# Patient Record
Sex: FEMALE | Race: WHITE | Employment: UNEMPLOYED | URBAN - METROPOLITAN AREA
[De-identification: names, ages, dates, MRNs, and addresses within clinical notes are randomized per-mention and may not be internally consistent; named-entity substitution may affect disease eponyms.]

---

## 2020-07-08 ENCOUNTER — HOSPITAL ENCOUNTER (OUTPATIENT)
Dept: PREADMISSION TESTING | Age: 70
Discharge: HOME | End: 2020-07-08
Payer: MEDICARE

## 2020-07-08 DIAGNOSIS — Z01.818 PREOPERATIVE EXAMINATION, UNSPECIFIED: ICD-10-CM

## 2020-07-08 LAB
ALBUMIN SERPL-MCNC: 3.9 G/DL (ref 3.4–5)
ALBUMIN/GLOB SERPL: 1.1 {RATIO} (ref 0.8–1.7)
ALP SERPL-CCNC: 96 U/L (ref 45–117)
ALT SERPL-CCNC: 29 U/L (ref 13–56)
ANION GAP SERPL CALC-SCNC: 6 MMOL/L (ref 3–18)
APPEARANCE UR: ABNORMAL
APTT PPP: 38.6 SEC (ref 23–36.4)
AST SERPL-CCNC: 18 U/L (ref 10–38)
ATRIAL RATE: 375 BPM
BACTERIA URNS QL MICRO: ABNORMAL /HPF
BASOPHILS # BLD: 0 K/UL (ref 0–0.1)
BASOPHILS NFR BLD: 0 % (ref 0–2)
BILIRUB SERPL-MCNC: 0.7 MG/DL (ref 0.2–1)
BILIRUB UR QL: NEGATIVE
BUN SERPL-MCNC: 24 MG/DL (ref 7–18)
BUN/CREAT SERPL: 26 (ref 12–20)
CALCIUM SERPL-MCNC: 8.7 MG/DL (ref 8.5–10.1)
CALCULATED R AXIS, ECG10: 64 DEGREES
CALCULATED T AXIS, ECG11: -145 DEGREES
CHLORIDE SERPL-SCNC: 103 MMOL/L (ref 100–111)
CO2 SERPL-SCNC: 29 MMOL/L (ref 21–32)
COLOR UR: YELLOW
CREAT SERPL-MCNC: 0.93 MG/DL (ref 0.6–1.3)
DIAGNOSIS, 93000: NORMAL
DIFFERENTIAL METHOD BLD: ABNORMAL
EOSINOPHIL # BLD: 0.2 K/UL (ref 0–0.4)
EOSINOPHIL NFR BLD: 2 % (ref 0–5)
EPITH CASTS URNS QL MICRO: ABNORMAL /LPF (ref 0–5)
ERYTHROCYTE [DISTWIDTH] IN BLOOD BY AUTOMATED COUNT: 13.4 % (ref 11.6–14.5)
ERYTHROCYTE [SEDIMENTATION RATE] IN BLOOD: 3 MM/HR (ref 0–30)
EST. AVERAGE GLUCOSE BLD GHB EST-MCNC: 169 MG/DL
GLOBULIN SER CALC-MCNC: 3.6 G/DL (ref 2–4)
GLUCOSE SERPL-MCNC: 191 MG/DL (ref 74–99)
GLUCOSE UR STRIP.AUTO-MCNC: NEGATIVE MG/DL
HBA1C MFR BLD: 7.5 % (ref 4.2–5.6)
HCT VFR BLD AUTO: 46.5 % (ref 35–45)
HGB BLD-MCNC: 15.1 G/DL (ref 12–16)
HGB UR QL STRIP: ABNORMAL
INR PPP: 2.4 (ref 0.8–1.2)
KETONES UR QL STRIP.AUTO: NEGATIVE MG/DL
LEUKOCYTE ESTERASE UR QL STRIP.AUTO: ABNORMAL
LYMPHOCYTES # BLD: 1.8 K/UL (ref 0.9–3.6)
LYMPHOCYTES NFR BLD: 17 % (ref 21–52)
MCH RBC QN AUTO: 29.8 PG (ref 24–34)
MCHC RBC AUTO-ENTMCNC: 32.5 G/DL (ref 31–37)
MCV RBC AUTO: 91.7 FL (ref 74–97)
MONOCYTES # BLD: 0.7 K/UL (ref 0.05–1.2)
MONOCYTES NFR BLD: 6 % (ref 3–10)
NEUTS SEG # BLD: 7.7 K/UL (ref 1.8–8)
NEUTS SEG NFR BLD: 75 % (ref 40–73)
NITRITE UR QL STRIP.AUTO: POSITIVE
PH UR STRIP: 5 [PH] (ref 5–8)
PLATELET # BLD AUTO: 199 K/UL (ref 135–420)
PMV BLD AUTO: 10.5 FL (ref 9.2–11.8)
POTASSIUM SERPL-SCNC: 4.1 MMOL/L (ref 3.5–5.5)
PROT SERPL-MCNC: 7.5 G/DL (ref 6.4–8.2)
PROT UR STRIP-MCNC: NEGATIVE MG/DL
PROTHROMBIN TIME: 25.5 SEC (ref 11.5–15.2)
Q-T INTERVAL, ECG07: 410 MS
QRS DURATION, ECG06: 90 MS
QTC CALCULATION (BEZET), ECG08: 512 MS
RBC # BLD AUTO: 5.07 M/UL (ref 4.2–5.3)
RBC #/AREA URNS HPF: ABNORMAL /HPF (ref 0–5)
SODIUM SERPL-SCNC: 138 MMOL/L (ref 136–145)
SP GR UR REFRACTOMETRY: 1.02 (ref 1–1.03)
UROBILINOGEN UR QL STRIP.AUTO: 0.2 EU/DL (ref 0.2–1)
VENTRICULAR RATE, ECG03: 94 BPM
WBC # BLD AUTO: 10.4 K/UL (ref 4.6–13.2)
WBC URNS QL MICRO: ABNORMAL /HPF (ref 0–5)

## 2020-07-08 PROCEDURE — 87186 SC STD MICRODIL/AGAR DIL: CPT

## 2020-07-08 PROCEDURE — 85730 THROMBOPLASTIN TIME PARTIAL: CPT

## 2020-07-08 PROCEDURE — 87086 URINE CULTURE/COLONY COUNT: CPT

## 2020-07-08 PROCEDURE — 83036 HEMOGLOBIN GLYCOSYLATED A1C: CPT

## 2020-07-08 PROCEDURE — 81001 URINALYSIS AUTO W/SCOPE: CPT

## 2020-07-08 PROCEDURE — 36415 COLL VENOUS BLD VENIPUNCTURE: CPT

## 2020-07-08 PROCEDURE — 85652 RBC SED RATE AUTOMATED: CPT

## 2020-07-08 PROCEDURE — 85610 PROTHROMBIN TIME: CPT

## 2020-07-08 PROCEDURE — 87077 CULTURE AEROBIC IDENTIFY: CPT

## 2020-07-08 PROCEDURE — 93005 ELECTROCARDIOGRAM TRACING: CPT

## 2020-07-08 PROCEDURE — 85025 COMPLETE CBC W/AUTO DIFF WBC: CPT

## 2020-07-08 PROCEDURE — 80053 COMPREHEN METABOLIC PANEL: CPT

## 2020-07-09 LAB
BACTERIA SPEC CULT: NORMAL
BACTERIA SPEC CULT: NORMAL
SERVICE CMNT-IMP: NORMAL

## 2020-07-11 LAB
BACTERIA SPEC CULT: ABNORMAL
CC UR VC: ABNORMAL
SERVICE CMNT-IMP: ABNORMAL

## 2021-01-07 ENCOUNTER — TRANSCRIBE ORDER (OUTPATIENT)
Dept: REGISTRATION | Age: 71
End: 2021-01-07

## 2021-01-07 ENCOUNTER — HOSPITAL ENCOUNTER (OUTPATIENT)
Dept: PREADMISSION TESTING | Age: 71
Discharge: HOME | End: 2021-01-07
Payer: MEDICARE

## 2021-01-07 DIAGNOSIS — Z01.818 OTHER SPECIFIED PRE-OPERATIVE EXAMINATION: Primary | ICD-10-CM

## 2021-01-07 LAB
ALBUMIN SERPL-MCNC: 3.6 G/DL (ref 3.4–5)
ALBUMIN/GLOB SERPL: 1.1 {RATIO} (ref 0.8–1.7)
ALP SERPL-CCNC: 75 U/L (ref 45–117)
ALT SERPL-CCNC: 25 U/L (ref 13–56)
ANION GAP SERPL CALC-SCNC: 7 MMOL/L (ref 3–18)
APPEARANCE UR: CLEAR
APTT PPP: 38.3 SEC (ref 23–36.4)
AST SERPL-CCNC: 17 U/L (ref 10–38)
ATRIAL RATE: 131 BPM
BACTERIA URNS QL MICRO: NEGATIVE /HPF
BASOPHILS # BLD: 0 K/UL (ref 0–0.1)
BASOPHILS NFR BLD: 0 % (ref 0–2)
BILIRUB SERPL-MCNC: 0.8 MG/DL (ref 0.2–1)
BILIRUB UR QL: NEGATIVE
BUN SERPL-MCNC: 22 MG/DL (ref 7–18)
BUN/CREAT SERPL: 32 (ref 12–20)
CALCIUM SERPL-MCNC: 8.5 MG/DL (ref 8.5–10.1)
CALCULATED R AXIS, ECG10: 71 DEGREES
CALCULATED T AXIS, ECG11: -104 DEGREES
CHLORIDE SERPL-SCNC: 105 MMOL/L (ref 100–111)
CO2 SERPL-SCNC: 30 MMOL/L (ref 21–32)
COLOR UR: YELLOW
CREAT SERPL-MCNC: 0.68 MG/DL (ref 0.6–1.3)
DIAGNOSIS, 93000: NORMAL
DIFFERENTIAL METHOD BLD: ABNORMAL
EOSINOPHIL # BLD: 0.2 K/UL (ref 0–0.4)
EOSINOPHIL NFR BLD: 2 % (ref 0–5)
EPITH CASTS URNS QL MICRO: NORMAL /LPF (ref 0–5)
ERYTHROCYTE [DISTWIDTH] IN BLOOD BY AUTOMATED COUNT: 13.8 % (ref 11.6–14.5)
ERYTHROCYTE [SEDIMENTATION RATE] IN BLOOD: 9 MM/HR (ref 0–30)
EST. AVERAGE GLUCOSE BLD GHB EST-MCNC: 137 MG/DL
GLOBULIN SER CALC-MCNC: 3.2 G/DL (ref 2–4)
GLUCOSE SERPL-MCNC: 102 MG/DL (ref 74–99)
GLUCOSE UR STRIP.AUTO-MCNC: NEGATIVE MG/DL
HBA1C MFR BLD: 6.4 % (ref 4.2–5.6)
HCT VFR BLD AUTO: 45.1 % (ref 35–45)
HGB BLD-MCNC: 14.7 G/DL (ref 12–16)
HGB UR QL STRIP: ABNORMAL
INR PPP: 2.1 (ref 0.8–1.2)
KETONES UR QL STRIP.AUTO: NEGATIVE MG/DL
LEUKOCYTE ESTERASE UR QL STRIP.AUTO: ABNORMAL
LYMPHOCYTES # BLD: 1.9 K/UL (ref 0.9–3.6)
LYMPHOCYTES NFR BLD: 23 % (ref 21–52)
MCH RBC QN AUTO: 30.4 PG (ref 24–34)
MCHC RBC AUTO-ENTMCNC: 32.6 G/DL (ref 31–37)
MCV RBC AUTO: 93.4 FL (ref 74–97)
MONOCYTES # BLD: 0.5 K/UL (ref 0.05–1.2)
MONOCYTES NFR BLD: 6 % (ref 3–10)
NEUTS SEG # BLD: 5.6 K/UL (ref 1.8–8)
NEUTS SEG NFR BLD: 69 % (ref 40–73)
NITRITE UR QL STRIP.AUTO: NEGATIVE
PH UR STRIP: 7 [PH] (ref 5–8)
PLATELET # BLD AUTO: 186 K/UL (ref 135–420)
PMV BLD AUTO: 10.7 FL (ref 9.2–11.8)
POTASSIUM SERPL-SCNC: 4.4 MMOL/L (ref 3.5–5.5)
PROT SERPL-MCNC: 6.8 G/DL (ref 6.4–8.2)
PROT UR STRIP-MCNC: NEGATIVE MG/DL
PROTHROMBIN TIME: 22.8 SEC (ref 11.5–15.2)
Q-T INTERVAL, ECG07: 204 MS
QRS DURATION, ECG06: 90 MS
QTC CALCULATION (BEZET), ECG08: 239 MS
RBC # BLD AUTO: 4.83 M/UL (ref 4.2–5.3)
RBC #/AREA URNS HPF: NORMAL /HPF (ref 0–5)
SODIUM SERPL-SCNC: 142 MMOL/L (ref 136–145)
SP GR UR REFRACTOMETRY: 1.02 (ref 1–1.03)
UROBILINOGEN UR QL STRIP.AUTO: 1 EU/DL (ref 0.2–1)
VENTRICULAR RATE, ECG03: 83 BPM
WBC # BLD AUTO: 8.2 K/UL (ref 4.6–13.2)
WBC URNS QL MICRO: NORMAL /HPF (ref 0–5)

## 2021-01-07 PROCEDURE — 93005 ELECTROCARDIOGRAM TRACING: CPT

## 2021-01-07 PROCEDURE — 36415 COLL VENOUS BLD VENIPUNCTURE: CPT

## 2021-01-07 PROCEDURE — 80053 COMPREHEN METABOLIC PANEL: CPT

## 2021-01-07 PROCEDURE — 85652 RBC SED RATE AUTOMATED: CPT

## 2021-01-07 PROCEDURE — 85730 THROMBOPLASTIN TIME PARTIAL: CPT

## 2021-01-07 PROCEDURE — 85025 COMPLETE CBC W/AUTO DIFF WBC: CPT

## 2021-01-07 PROCEDURE — 85610 PROTHROMBIN TIME: CPT

## 2021-01-07 PROCEDURE — 81001 URINALYSIS AUTO W/SCOPE: CPT

## 2021-01-07 PROCEDURE — 83036 HEMOGLOBIN GLYCOSYLATED A1C: CPT

## 2021-01-09 LAB
BACTERIA SPEC CULT: NORMAL
BACTERIA SPEC CULT: NORMAL
SERVICE CMNT-IMP: NORMAL

## 2021-01-19 ENCOUNTER — HOSPITAL ENCOUNTER (OUTPATIENT)
Dept: PREADMISSION TESTING | Age: 71
Discharge: HOME | End: 2021-01-19
Payer: MEDICARE

## 2021-01-19 PROCEDURE — U0003 INFECTIOUS AGENT DETECTION BY NUCLEIC ACID (DNA OR RNA); SEVERE ACUTE RESPIRATORY SYNDROME CORONAVIRUS 2 (SARS-COV-2) (CORONAVIRUS DISEASE [COVID-19]), AMPLIFIED PROBE TECHNIQUE, MAKING USE OF HIGH THROUGHPUT TECHNOLOGIES AS DESCRIBED BY CMS-2020-01-R: HCPCS

## 2021-01-20 LAB — SARS-COV-2, COV2NT: NOT DETECTED

## 2021-01-24 ENCOUNTER — ANESTHESIA EVENT (OUTPATIENT)
Dept: SURGERY | Age: 71
DRG: 982 | End: 2021-01-24
Payer: MEDICARE

## 2021-01-25 ENCOUNTER — APPOINTMENT (OUTPATIENT)
Dept: GENERAL RADIOLOGY | Age: 71
DRG: 982 | End: 2021-01-25
Attending: PHYSICIAN ASSISTANT
Payer: MEDICARE

## 2021-01-25 ENCOUNTER — ANESTHESIA (OUTPATIENT)
Dept: SURGERY | Age: 71
DRG: 982 | End: 2021-01-25
Payer: MEDICARE

## 2021-01-25 ENCOUNTER — HOSPITAL ENCOUNTER (INPATIENT)
Age: 71
LOS: 5 days | Discharge: HOME HEALTH | DRG: 982 | End: 2021-02-01
Attending: ORTHOPAEDIC SURGERY | Admitting: ORTHOPAEDIC SURGERY
Payer: MEDICARE

## 2021-01-25 DIAGNOSIS — Z96.651 S/P TKR (TOTAL KNEE REPLACEMENT), RIGHT: Primary | ICD-10-CM

## 2021-01-25 PROBLEM — M17.11 OSTEOARTHRITIS OF RIGHT KNEE: Status: ACTIVE | Noted: 2021-01-25

## 2021-01-25 LAB
APTT PPP: 27.8 SEC (ref 23–36.4)
GLUCOSE BLD STRIP.AUTO-MCNC: 148 MG/DL (ref 70–110)
GLUCOSE BLD STRIP.AUTO-MCNC: 93 MG/DL (ref 70–110)
HCT VFR BLD AUTO: 40.2 % (ref 35–45)
HGB BLD-MCNC: 12.8 G/DL (ref 12–16)
INR PPP: 1.1 (ref 0.8–1.2)
PROTHROMBIN TIME: 14 SEC (ref 11.5–15.2)

## 2021-01-25 PROCEDURE — 76060000036 HC ANESTHESIA 2.5 TO 3 HR: Performed by: ORTHOPAEDIC SURGERY

## 2021-01-25 PROCEDURE — 0SRC0JA REPLACEMENT OF RIGHT KNEE JOINT WITH SYNTHETIC SUBSTITUTE, UNCEMENTED, OPEN APPROACH: ICD-10-PCS | Performed by: ORTHOPAEDIC SURGERY

## 2021-01-25 PROCEDURE — 82962 GLUCOSE BLOOD TEST: CPT

## 2021-01-25 PROCEDURE — C9290 INJ, BUPIVACAINE LIPOSOME: HCPCS | Performed by: ORTHOPAEDIC SURGERY

## 2021-01-25 PROCEDURE — 86923 COMPATIBILITY TEST ELECTRIC: CPT

## 2021-01-25 PROCEDURE — 77030002933 HC SUT MCRYL J&J -A: Performed by: ORTHOPAEDIC SURGERY

## 2021-01-25 PROCEDURE — 74011250636 HC RX REV CODE- 250/636: Performed by: ORTHOPAEDIC SURGERY

## 2021-01-25 PROCEDURE — 77030000032 HC CUF TRNQT ZIMM -B: Performed by: ORTHOPAEDIC SURGERY

## 2021-01-25 PROCEDURE — 2709999900 HC NON-CHARGEABLE SUPPLY: Performed by: ORTHOPAEDIC SURGERY

## 2021-01-25 PROCEDURE — 77030016060 HC NDL NRV BLK TELE -A: Performed by: ANESTHESIOLOGY

## 2021-01-25 PROCEDURE — 77030020782 HC GWN BAIR PAWS FLX 3M -B: Performed by: ORTHOPAEDIC SURGERY

## 2021-01-25 PROCEDURE — 77030027138 HC INCENT SPIROMETER -A: Performed by: ORTHOPAEDIC SURGERY

## 2021-01-25 PROCEDURE — C1776 JOINT DEVICE (IMPLANTABLE): HCPCS | Performed by: ORTHOPAEDIC SURGERY

## 2021-01-25 PROCEDURE — 77030038011: Performed by: ORTHOPAEDIC SURGERY

## 2021-01-25 PROCEDURE — 77030031139 HC SUT VCRL2 J&J -A: Performed by: ORTHOPAEDIC SURGERY

## 2021-01-25 PROCEDURE — 76010000132 HC OR TIME 2.5 TO 3 HR: Performed by: ORTHOPAEDIC SURGERY

## 2021-01-25 PROCEDURE — 73560 X-RAY EXAM OF KNEE 1 OR 2: CPT

## 2021-01-25 PROCEDURE — 85730 THROMBOPLASTIN TIME PARTIAL: CPT

## 2021-01-25 PROCEDURE — 74011000250 HC RX REV CODE- 250: Performed by: ANESTHESIOLOGY

## 2021-01-25 PROCEDURE — 86901 BLOOD TYPING SEROLOGIC RH(D): CPT

## 2021-01-25 PROCEDURE — 74011000258 HC RX REV CODE- 258: Performed by: ORTHOPAEDIC SURGERY

## 2021-01-25 PROCEDURE — 77030020269 HC MISC IMPL: Performed by: ORTHOPAEDIC SURGERY

## 2021-01-25 PROCEDURE — 64450 NJX AA&/STRD OTHER PN/BRANCH: CPT | Performed by: ANESTHESIOLOGY

## 2021-01-25 PROCEDURE — 85014 HEMATOCRIT: CPT

## 2021-01-25 PROCEDURE — 74011250636 HC RX REV CODE- 250/636: Performed by: ANESTHESIOLOGY

## 2021-01-25 PROCEDURE — 74011250636 HC RX REV CODE- 250/636: Performed by: NURSE ANESTHETIST, CERTIFIED REGISTERED

## 2021-01-25 PROCEDURE — P9016 RBC LEUKOCYTES REDUCED: HCPCS

## 2021-01-25 PROCEDURE — 76210000016 HC OR PH I REC 1 TO 1.5 HR: Performed by: ORTHOPAEDIC SURGERY

## 2021-01-25 PROCEDURE — 77030033067 HC SUT PDO STRATFX SPIR J&J -B: Performed by: ORTHOPAEDIC SURGERY

## 2021-01-25 PROCEDURE — 77030040815: Performed by: ORTHOPAEDIC SURGERY

## 2021-01-25 PROCEDURE — 77030040361 HC SLV COMPR DVT MDII -B: Performed by: ORTHOPAEDIC SURGERY

## 2021-01-25 PROCEDURE — 77030035643 HC BLD SAW OSC PRECIS STRY -C: Performed by: ORTHOPAEDIC SURGERY

## 2021-01-25 PROCEDURE — 77030014144 HC TY SPN ANES BBMI -B: Performed by: ANESTHESIOLOGY

## 2021-01-25 PROCEDURE — 76942 ECHO GUIDE FOR BIOPSY: CPT | Performed by: ORTHOPAEDIC SURGERY

## 2021-01-25 PROCEDURE — 77030034479 HC ADH SKN CLSR PRINEO J&J -B: Performed by: ORTHOPAEDIC SURGERY

## 2021-01-25 PROCEDURE — 77030013708 HC HNDPC SUC IRR PULS STRY –B: Performed by: ORTHOPAEDIC SURGERY

## 2021-01-25 PROCEDURE — 36415 COLL VENOUS BLD VENIPUNCTURE: CPT

## 2021-01-25 PROCEDURE — 85610 PROTHROMBIN TIME: CPT

## 2021-01-25 PROCEDURE — 77030003666 HC NDL SPINAL BD -A: Performed by: ORTHOPAEDIC SURGERY

## 2021-01-25 PROCEDURE — 77030011264 HC ELECTRD BLD EXT COVD -A: Performed by: ORTHOPAEDIC SURGERY

## 2021-01-25 PROCEDURE — 74011250637 HC RX REV CODE- 250/637: Performed by: ANESTHESIOLOGY

## 2021-01-25 PROCEDURE — 88305 TISSUE EXAM BY PATHOLOGIST: CPT

## 2021-01-25 PROCEDURE — 74011000250 HC RX REV CODE- 250: Performed by: ORTHOPAEDIC SURGERY

## 2021-01-25 PROCEDURE — 74011000258 HC RX REV CODE- 258: Performed by: NURSE ANESTHETIST, CERTIFIED REGISTERED

## 2021-01-25 PROCEDURE — 3E0T3BZ INTRODUCTION OF ANESTHETIC AGENT INTO PERIPHERAL NERVES AND PLEXI, PERCUTANEOUS APPROACH: ICD-10-PCS | Performed by: ORTHOPAEDIC SURGERY

## 2021-01-25 PROCEDURE — 74011000250 HC RX REV CODE- 250: Performed by: NURSE ANESTHETIST, CERTIFIED REGISTERED

## 2021-01-25 DEVICE — PATELLA
Type: IMPLANTABLE DEVICE | Site: KNEE | Status: FUNCTIONAL
Brand: TRIATHLON

## 2021-01-25 DEVICE — CRUCIATE RETAINING FEMORAL
Type: IMPLANTABLE DEVICE | Site: KNEE | Status: FUNCTIONAL
Brand: TRIATHLON

## 2021-01-25 DEVICE — TIBIAL BEARING INSERT - CS
Type: IMPLANTABLE DEVICE | Site: KNEE | Status: FUNCTIONAL
Brand: TRIATHLON

## 2021-01-25 DEVICE — KNEE K2 TOT HEMI ADV CMTLS -- IMPL CAPPED K2: Type: IMPLANTABLE DEVICE | Site: KNEE | Status: FUNCTIONAL

## 2021-01-25 DEVICE — TIBIAL COMPONENT
Type: IMPLANTABLE DEVICE | Site: KNEE | Status: FUNCTIONAL
Brand: TRIATHLON

## 2021-01-25 RX ORDER — ONDANSETRON 2 MG/ML
INJECTION INTRAMUSCULAR; INTRAVENOUS AS NEEDED
Status: DISCONTINUED | OUTPATIENT
Start: 2021-01-25 | End: 2021-01-25 | Stop reason: HOSPADM

## 2021-01-25 RX ORDER — LEVOTHYROXINE AND LIOTHYRONINE 19; 4.5 UG/1; UG/1
60 TABLET ORAL DAILY
Status: DISCONTINUED | OUTPATIENT
Start: 2021-01-26 | End: 2021-02-01 | Stop reason: HOSPADM

## 2021-01-25 RX ORDER — SPIRONOLACTONE 25 MG/1
12.5 TABLET ORAL
Status: DISCONTINUED | OUTPATIENT
Start: 2021-01-26 | End: 2021-02-01 | Stop reason: HOSPADM

## 2021-01-25 RX ORDER — TRANEXAMIC ACID 10 MG/ML
1 INJECTION, SOLUTION INTRAVENOUS ONCE
Status: DISCONTINUED | OUTPATIENT
Start: 2021-01-25 | End: 2021-01-25 | Stop reason: HOSPADM

## 2021-01-25 RX ORDER — SODIUM CHLORIDE 0.9 % (FLUSH) 0.9 %
5-40 SYRINGE (ML) INJECTION AS NEEDED
Status: DISCONTINUED | OUTPATIENT
Start: 2021-01-25 | End: 2021-02-01 | Stop reason: HOSPADM

## 2021-01-25 RX ORDER — ACETAMINOPHEN 500 MG
1000 TABLET ORAL
Status: COMPLETED | OUTPATIENT
Start: 2021-01-25 | End: 2021-01-25

## 2021-01-25 RX ORDER — VANCOMYCIN 2 GRAM/500 ML IN 0.9 % SODIUM CHLORIDE INTRAVENOUS
2000 EVERY 12 HOURS
Status: COMPLETED | OUTPATIENT
Start: 2021-01-25 | End: 2021-01-26

## 2021-01-25 RX ORDER — VANCOMYCIN 2 GRAM/500 ML IN 0.9 % SODIUM CHLORIDE INTRAVENOUS
2000 ONCE
Status: COMPLETED | OUTPATIENT
Start: 2021-01-25 | End: 2021-01-25

## 2021-01-25 RX ORDER — HYDROMORPHONE HYDROCHLORIDE 1 MG/ML
0.5 INJECTION, SOLUTION INTRAMUSCULAR; INTRAVENOUS; SUBCUTANEOUS
Status: DISCONTINUED | OUTPATIENT
Start: 2021-01-25 | End: 2021-02-01 | Stop reason: HOSPADM

## 2021-01-25 RX ORDER — SODIUM CHLORIDE, SODIUM LACTATE, POTASSIUM CHLORIDE, CALCIUM CHLORIDE 600; 310; 30; 20 MG/100ML; MG/100ML; MG/100ML; MG/100ML
75 INJECTION, SOLUTION INTRAVENOUS CONTINUOUS
Status: DISPENSED | OUTPATIENT
Start: 2021-01-25 | End: 2021-01-26

## 2021-01-25 RX ORDER — DILTIAZEM HYDROCHLORIDE 120 MG/1
120 CAPSULE, COATED, EXTENDED RELEASE ORAL DAILY
Status: DISCONTINUED | OUTPATIENT
Start: 2021-01-26 | End: 2021-01-28

## 2021-01-25 RX ORDER — MONTELUKAST SODIUM 10 MG/1
10 TABLET ORAL
Status: DISCONTINUED | OUTPATIENT
Start: 2021-01-26 | End: 2021-02-01 | Stop reason: HOSPADM

## 2021-01-25 RX ORDER — OXYCODONE HYDROCHLORIDE 5 MG/1
5 TABLET ORAL AS NEEDED
Status: COMPLETED | OUTPATIENT
Start: 2021-01-25 | End: 2021-01-26

## 2021-01-25 RX ORDER — SODIUM CHLORIDE, SODIUM LACTATE, POTASSIUM CHLORIDE, CALCIUM CHLORIDE 600; 310; 30; 20 MG/100ML; MG/100ML; MG/100ML; MG/100ML
125 INJECTION, SOLUTION INTRAVENOUS CONTINUOUS
Status: DISCONTINUED | OUTPATIENT
Start: 2021-01-25 | End: 2021-02-01

## 2021-01-25 RX ORDER — SODIUM CHLORIDE 0.9 % (FLUSH) 0.9 %
5-40 SYRINGE (ML) INJECTION AS NEEDED
Status: DISCONTINUED | OUTPATIENT
Start: 2021-01-25 | End: 2021-01-25 | Stop reason: HOSPADM

## 2021-01-25 RX ORDER — NALOXONE HYDROCHLORIDE 0.4 MG/ML
0.4 INJECTION, SOLUTION INTRAMUSCULAR; INTRAVENOUS; SUBCUTANEOUS AS NEEDED
Status: DISCONTINUED | OUTPATIENT
Start: 2021-01-25 | End: 2021-02-01 | Stop reason: HOSPADM

## 2021-01-25 RX ORDER — ACETAMINOPHEN 500 MG
500 TABLET ORAL
COMMUNITY
End: 2021-02-01

## 2021-01-25 RX ORDER — ROPIVACAINE HYDROCHLORIDE 5 MG/ML
INJECTION, SOLUTION EPIDURAL; INFILTRATION; PERINEURAL
Status: COMPLETED | OUTPATIENT
Start: 2021-01-25 | End: 2021-01-25

## 2021-01-25 RX ORDER — CARVEDILOL 12.5 MG/1
25 TABLET ORAL 2 TIMES DAILY WITH MEALS
Status: DISCONTINUED | OUTPATIENT
Start: 2021-01-26 | End: 2021-02-01 | Stop reason: HOSPADM

## 2021-01-25 RX ORDER — ENOXAPARIN SODIUM 100 MG/ML
30 INJECTION SUBCUTANEOUS EVERY 12 HOURS
Status: DISCONTINUED | OUTPATIENT
Start: 2021-01-26 | End: 2021-02-01 | Stop reason: HOSPADM

## 2021-01-25 RX ORDER — HYDROMORPHONE HYDROCHLORIDE 1 MG/ML
0.5 INJECTION, SOLUTION INTRAMUSCULAR; INTRAVENOUS; SUBCUTANEOUS
Status: DISCONTINUED | OUTPATIENT
Start: 2021-01-25 | End: 2021-01-25 | Stop reason: HOSPADM

## 2021-01-25 RX ORDER — ALPRAZOLAM 0.5 MG/1
0.5 TABLET ORAL
Status: DISCONTINUED | OUTPATIENT
Start: 2021-01-25 | End: 2021-02-01 | Stop reason: HOSPADM

## 2021-01-25 RX ORDER — ONDANSETRON 2 MG/ML
4 INJECTION INTRAMUSCULAR; INTRAVENOUS
Status: DISCONTINUED | OUTPATIENT
Start: 2021-01-25 | End: 2021-02-01 | Stop reason: HOSPADM

## 2021-01-25 RX ORDER — PROPOFOL 10 MG/ML
INJECTION, EMULSION INTRAVENOUS
Status: DISCONTINUED | OUTPATIENT
Start: 2021-01-25 | End: 2021-01-25 | Stop reason: HOSPADM

## 2021-01-25 RX ORDER — SODIUM CHLORIDE, SODIUM LACTATE, POTASSIUM CHLORIDE, CALCIUM CHLORIDE 600; 310; 30; 20 MG/100ML; MG/100ML; MG/100ML; MG/100ML
125 INJECTION, SOLUTION INTRAVENOUS CONTINUOUS
Status: DISCONTINUED | OUTPATIENT
Start: 2021-01-25 | End: 2021-01-25 | Stop reason: HOSPADM

## 2021-01-25 RX ORDER — PROPOFOL 10 MG/ML
INJECTION, EMULSION INTRAVENOUS AS NEEDED
Status: DISCONTINUED | OUTPATIENT
Start: 2021-01-25 | End: 2021-01-25 | Stop reason: HOSPADM

## 2021-01-25 RX ORDER — LIDOCAINE HYDROCHLORIDE 20 MG/ML
INJECTION, SOLUTION EPIDURAL; INFILTRATION; INTRACAUDAL; PERINEURAL AS NEEDED
Status: DISCONTINUED | OUTPATIENT
Start: 2021-01-25 | End: 2021-01-25 | Stop reason: HOSPADM

## 2021-01-25 RX ORDER — DIPHENHYDRAMINE HCL 25 MG
25 CAPSULE ORAL
Status: DISCONTINUED | OUTPATIENT
Start: 2021-01-25 | End: 2021-02-01 | Stop reason: HOSPADM

## 2021-01-25 RX ORDER — KETAMINE HCL IN 0.9 % NACL 50 MG/5 ML
SYRINGE (ML) INTRAVENOUS AS NEEDED
Status: DISCONTINUED | OUTPATIENT
Start: 2021-01-25 | End: 2021-01-25 | Stop reason: HOSPADM

## 2021-01-25 RX ORDER — HYDROCODONE BITARTRATE AND ACETAMINOPHEN 5; 325 MG/1; MG/1
1-2 TABLET ORAL
Status: DISCONTINUED | OUTPATIENT
Start: 2021-01-25 | End: 2021-02-01 | Stop reason: HOSPADM

## 2021-01-25 RX ORDER — MOXIFLOXACIN HYDROCHLORIDE 400 MG/1
400 TABLET ORAL
Status: DISCONTINUED | OUTPATIENT
Start: 2021-01-26 | End: 2021-02-01 | Stop reason: HOSPADM

## 2021-01-25 RX ORDER — SODIUM CHLORIDE 0.9 % (FLUSH) 0.9 %
5-40 SYRINGE (ML) INJECTION EVERY 8 HOURS
Status: DISCONTINUED | OUTPATIENT
Start: 2021-01-25 | End: 2021-01-25 | Stop reason: HOSPADM

## 2021-01-25 RX ORDER — TRANEXAMIC ACID 100 MG/ML
1 INJECTION, SOLUTION INTRAVENOUS ONCE
Status: DISCONTINUED | OUTPATIENT
Start: 2021-01-25 | End: 2021-01-25 | Stop reason: HOSPADM

## 2021-01-25 RX ORDER — WARFARIN 1 MG/1
3 TABLET ORAL ONCE
Status: COMPLETED | OUTPATIENT
Start: 2021-01-25 | End: 2021-01-26

## 2021-01-25 RX ORDER — SODIUM CHLORIDE 0.9 % (FLUSH) 0.9 %
5-40 SYRINGE (ML) INJECTION EVERY 8 HOURS
Status: DISCONTINUED | OUTPATIENT
Start: 2021-01-25 | End: 2021-01-29

## 2021-01-25 RX ORDER — CELECOXIB 100 MG/1
200 CAPSULE ORAL
Status: COMPLETED | OUTPATIENT
Start: 2021-01-25 | End: 2021-01-25

## 2021-01-25 RX ORDER — MIDAZOLAM HYDROCHLORIDE 1 MG/ML
INJECTION, SOLUTION INTRAMUSCULAR; INTRAVENOUS AS NEEDED
Status: DISCONTINUED | OUTPATIENT
Start: 2021-01-25 | End: 2021-01-25 | Stop reason: HOSPADM

## 2021-01-25 RX ADMIN — PHENYLEPHRINE HYDROCHLORIDE 100 MCG: 10 INJECTION INTRAVENOUS at 16:33

## 2021-01-25 RX ADMIN — PHENYLEPHRINE HYDROCHLORIDE 100 MCG: 10 INJECTION INTRAVENOUS at 16:48

## 2021-01-25 RX ADMIN — MIDAZOLAM HYDROCHLORIDE 2 MG: 1 INJECTION, SOLUTION INTRAMUSCULAR; INTRAVENOUS at 15:05

## 2021-01-25 RX ADMIN — PHENYLEPHRINE HYDROCHLORIDE 100 MCG: 10 INJECTION INTRAVENOUS at 15:05

## 2021-01-25 RX ADMIN — PHENYLEPHRINE HYDROCHLORIDE 100 MCG: 10 INJECTION INTRAVENOUS at 16:32

## 2021-01-25 RX ADMIN — VANCOMYCIN HYDROCHLORIDE 2000 MG: 10 INJECTION, POWDER, LYOPHILIZED, FOR SOLUTION INTRAVENOUS at 13:55

## 2021-01-25 RX ADMIN — PROPOFOL 100 MCG/KG/MIN: 10 INJECTION, EMULSION INTRAVENOUS at 15:19

## 2021-01-25 RX ADMIN — PHENYLEPHRINE HYDROCHLORIDE 100 MCG: 10 INJECTION INTRAVENOUS at 15:38

## 2021-01-25 RX ADMIN — HYDROMORPHONE HYDROCHLORIDE 0.5 MG: 1 INJECTION, SOLUTION INTRAMUSCULAR; INTRAVENOUS; SUBCUTANEOUS at 18:33

## 2021-01-25 RX ADMIN — SODIUM CHLORIDE, SODIUM LACTATE, POTASSIUM CHLORIDE, AND CALCIUM CHLORIDE 125 ML/HR: 600; 310; 30; 20 INJECTION, SOLUTION INTRAVENOUS at 18:35

## 2021-01-25 RX ADMIN — LIDOCAINE HYDROCHLORIDE 40 MG: 20 INJECTION, SOLUTION EPIDURAL; INFILTRATION; INTRACAUDAL; PERINEURAL at 15:13

## 2021-01-25 RX ADMIN — Medication 20 MG: at 15:07

## 2021-01-25 RX ADMIN — MIDAZOLAM HYDROCHLORIDE 4 MG: 1 INJECTION, SOLUTION INTRAMUSCULAR; INTRAVENOUS at 13:22

## 2021-01-25 RX ADMIN — MIDAZOLAM HYDROCHLORIDE 2 MG: 1 INJECTION, SOLUTION INTRAMUSCULAR; INTRAVENOUS at 14:47

## 2021-01-25 RX ADMIN — PHENYLEPHRINE HYDROCHLORIDE 100 MCG: 10 INJECTION INTRAVENOUS at 15:49

## 2021-01-25 RX ADMIN — PROPOFOL 20 MCG: 10 INJECTION, EMULSION INTRAVENOUS at 15:15

## 2021-01-25 RX ADMIN — CELECOXIB 200 MG: 100 CAPSULE ORAL at 12:44

## 2021-01-25 RX ADMIN — PHENYLEPHRINE HYDROCHLORIDE 100 MCG: 10 INJECTION INTRAVENOUS at 16:03

## 2021-01-25 RX ADMIN — ACETAMINOPHEN 1000 MG: 500 TABLET ORAL at 12:44

## 2021-01-25 RX ADMIN — PROPOFOL 20 MCG: 10 INJECTION, EMULSION INTRAVENOUS at 15:17

## 2021-01-25 RX ADMIN — SODIUM CHLORIDE, SODIUM LACTATE, POTASSIUM CHLORIDE, AND CALCIUM CHLORIDE 125 ML/HR: 600; 310; 30; 20 INJECTION, SOLUTION INTRAVENOUS at 12:28

## 2021-01-25 RX ADMIN — Medication 10 MG: at 13:23

## 2021-01-25 RX ADMIN — Medication 20 MG: at 13:22

## 2021-01-25 RX ADMIN — SODIUM CHLORIDE, SODIUM LACTATE, POTASSIUM CHLORIDE, AND CALCIUM CHLORIDE: 600; 310; 30; 20 INJECTION, SOLUTION INTRAVENOUS at 16:03

## 2021-01-25 RX ADMIN — ROPIVACAINE HYDROCHLORIDE 25 ML: 5 INJECTION, SOLUTION EPIDURAL; INFILTRATION; PERINEURAL at 13:28

## 2021-01-25 RX ADMIN — SODIUM CHLORIDE, SODIUM LACTATE, POTASSIUM CHLORIDE, AND CALCIUM CHLORIDE 125 ML/HR: 600; 310; 30; 20 INJECTION, SOLUTION INTRAVENOUS at 17:34

## 2021-01-25 RX ADMIN — PROPOFOL 20 MCG: 10 INJECTION, EMULSION INTRAVENOUS at 15:13

## 2021-01-25 RX ADMIN — PHENYLEPHRINE HYDROCHLORIDE 100 MCG: 10 INJECTION INTRAVENOUS at 15:58

## 2021-01-25 RX ADMIN — ONDANSETRON HYDROCHLORIDE 4 MG: 2 INJECTION INTRAMUSCULAR; INTRAVENOUS at 16:50

## 2021-01-25 NOTE — PERIOP NOTES
Patient's preop prolonged due to severe impaired mobility and poor historian. Patient required two nurses for assistance to the restroom.

## 2021-01-25 NOTE — ANESTHESIA POSTPROCEDURE EVALUATION
Procedure(s):  RIGHT TOTAL KNEE REPLACEMENT.    spinal    Anesthesia Post Evaluation        Comments: Post-Anesthesia Evaluation and Assessment    Cardiovascular Function/Vital Signs  /66   Pulse 71   Temp 37.4 °C (99.3 °F)   Resp 19   Ht 5' 8.5\" (1.74 m)   Wt 140.6 kg (310 lb)   SpO2 100%   BMI 46.45 kg/m²     Patient is status post Procedure(s):  RIGHT TOTAL KNEE REPLACEMENT. Nausea/Vomiting: Controlled. Postoperative hydration reviewed and adequate. Pain:  Pain Scale 1: FLACC (01/25/21 1717)  Pain Intensity 1: 0 (01/25/21 1717)   Managed. Neurological Status:   Neuro (WDL): Exceptions to WDL (01/25/21 1717)   At baseline. Mental Status and Level of Consciousness: Arousable. Pulmonary Status:   O2 Device: Non-rebreather mask (01/25/21 1717)   Adequate oxygenation and airway patent. Complications related to anesthesia: None    Post-anesthesia assessment completed. No concerns. Patient has met all discharge requirements. Signed By: Lydia Franklin MD    January 25, 2021                     INITIAL Post-op Vital signs:   Vitals Value Taken Time   /67 01/25/21 1810   Temp 37.4 °C (99.3 °F) 01/25/21 1717   Pulse 71 01/25/21 1813   Resp 23 01/25/21 1813   SpO2 100 % 01/25/21 1813   Vitals shown include unvalidated device data.

## 2021-01-25 NOTE — H&P
[address] [facility] Medicine  History and Physical Exam    Patient: Tato Capone MRN: [id number]  SSN: [id number]    YOB: 1950  Age: 69 y.o. Sex: female      Subjective:      Chief Complaint: right knee pain    History of Present Illness:  Patient complains of right knee pain and difficulty ambulating. Past Medical History:   Diagnosis Date    Arrhythmia     a-fib    Arthritis     Cancer ([facility]     endometrial     basal skin    Chronic obstructive pulmonary disease (HCC)     no inhaler    Diabetes ([facility]     no meds at present 7-15-20    Heart failure ([facility] 2014    CHF    Hypertension 2000    [facility]     Psychiatric disorder     depression    Sleep apnea     instructed to bring morning of surgery    Thyroid disease      Past Surgical History:   Procedure Laterality Date    HX ADENOIDECTOMY      HX HYSTERECTOMY      HX OOPHORECTOMY      HX TONSILLECTOMY       Social History     Occupational History    Not on file   Tobacco Use    Smoking status: Former Smoker     Quit date: 1974     Years since quittin.0    Smokeless tobacco: Never Used   Substance and Sexual Activity    Alcohol use: Not Currently     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 Authorizing Provider   montelukast (SINGULAIR) 10 mg tablet Take 10 mg by mouth daily (after lunch). Provider, Historical   dulaglutide (Trulicity) 3.85 OH/5.7 mL sub-q pen 0.75 mg by SubCUTAneous route every seven (7) days. Every Wednesday    Provider, Historical   carvediloL (COREG) 25 mg tablet Take 25 mg by mouth two (2) times daily (with meals). Provider, Historical   spironolactone (ALDACTONE) 25 mg tablet Take 12.5 mg by mouth daily (after lunch). Provider, Historical   thyroid, Pork, (Elmer Thyroid) 60 mg tablet Take 60 mg by mouth daily. Provider, Historical   dilTIAZem ER (DILACOR XR) 120 mg capsule Take 120 mg by mouth daily. Provider, Historical   Cetirizine 10 mg cap Take  by mouth nightly. Provider, Historical   warfarin (Coumadin) 3 mg tablet Take 3 mg by mouth nightly. Provider, Historical   ALPRAZolam (XANAX) 1 mg tablet Take 0.5 mg by mouth nightly. Provider, Historical   HYDROcodone-acetaminophen (NORCO) 5-325 mg per tablet Take 1 Tab by mouth every eight (8) hours as needed for Pain. Provider, Historical   cholecalciferol, vitamin D3, (Vitamin D3) 50 mcg (2,000 unit) tab Take  by mouth. Provider, Historical   potassium 99 mg tablet Take 188 mg by mouth daily. Provider, Historical       Allergies: Allergies   Allergen Reactions    Iodinated Contrast Media Anaphylaxis    Demerol [Meperidine] Shortness of Breath    Epinephrine Other (comments)     Heart issues    Gabapentin Other (comments)     smell    Penicillins Shortness of Breath    Percocet [Oxycodone-Acetaminophen] Nausea and Vomiting    Seafood Shortness of Breath and Nausea and Vomiting        Review of Systems:  A comprehensive review of systems was negative except for that written in the History of Present Illness. Family History: \"Cancer,\" diabetes mellitus II, Osteoporosis, arthritis, heart disease    Objective:       Physical Exam:  HEENT: Normocephalic, atraumatic  Lungs:  Clear to auscultation  Heart:   Regular rate and rhythm  Abdomen: Soft  Extremities:  Pain with range of motion of the right knee  Neurological: Grossly neurovascularly intact    Assessment:      Arthritis of the right knee. Plan:       The patient has failed previous efforts of conservative management to include non-steroidal anti-inflammatory medications. Due to the fact that conservative efforts failed, the patient became a candidate for surgical intervention. Proceed with scheduled right total knee arthroplasty.   The various methods of treatment have been discussed with the patient and family. After consideration of risks, benefits, and other options for treatment, the patient has consented to surgical interventions. Questions were answered and preoperative teaching was done by Dr. Jose Avendaño.      Signed By: Ghada Mancini PA-C     January 24, 2021

## 2021-01-25 NOTE — BRIEF OP NOTE
Brief Postoperative Note    Patient: Rosie Santos  YOB: 1950  MRN: [id number]    Date of Procedure: 1/25/2021     Pre-Op Diagnosis: UNILATERAL PRIMARY OSTEOARTHRITIS RIGHT KNEE    Post-Op Diagnosis: Same as preoperative diagnosis. Procedure(s):  RIGHT TOTAL KNEE REPLACEMENT    Surgeon(s):  Owen Hogan MD    Surgical Assistant: Physician Assistant: Maykel Weldon PA-C  Surg Asst-1: [facility]    Anesthesia: Spinal     Estimated Blood Loss (mL): 280    Complications: None    Specimens:   ID Type Source Tests Collected by Time Destination   1 : synovium right knee Preservative Knee, right  Owen Hogan MD 1/25/2021 1546 Pathology        Implants:   Implant Name Type Inv.  Item Serial No.  Lot No. LRB No. Used Action   COMPONENT PAT [id number] SUPERIOR/INFERIOR KNEE - [id number]  COMPONENT PAT [id number] SUPERIOR/INFERIOR KNEE  [employer] [id number] Right 1 Implanted   BASEPLATE TIB SZ 6 [id number] ML77MM KNEE TRITANIUM 4 CRUCFRM - [id number]  BASEPLATE TIB SZ 6 [id number] ML77MM KNEE TRITANIUM 4 CRUCFRM  [employer] [id number] Right 1 Implanted   COMPONENT FEM SZ 6 R KNEE CRUCE RET CEMENTLESS BEAD W/ CHLOÉ - [id number]  COMPONENT FEM SZ 6 R KNEE CRUCE RET CEMENTLESS BEAD W/ CHLOÉ  [employer]_WD [id number] Right 1 Implanted   tibial bearing insert cs    [employer] ARACELIS [id number] Right 1 Implanted       Drains: * No LDAs found *    Findings: Severe DJD    Electronically Signed by Radha Yarbrough PA-C on 1/25/2021 at 5:13 PM Specialty Liaison: Spoke with [facility] - Rep stated they didn't receive the Forteo prescriptions from December or January 9th and cannot take a verbal. It must be a hard copy. We need to send another RX for Forteo to [facility]. Liaison will local print and manually fax RX.

## 2021-01-25 NOTE — ANESTHESIA PROCEDURE NOTES
Peripheral Block    Start time: 1/25/2021 1:22 PM  End time: 1/25/2021 1:28 PM  Performed by: Susan Ordonez MD  Authorized by: Susan Ordonez MD       Pre-procedure: Indications: at surgeon's request and post-op pain management    Preanesthetic Checklist: patient identified, risks and benefits discussed, site marked, timeout performed, anesthesia consent given and patient being monitored    Timeout Time: 13:22          Block Type:   Block Type: Adductor canal  Laterality:  Right  Monitoring:  Standard ASA monitoring, continuous pulse ox, frequent vital sign checks, heart rate, oxygen and responsive to questions  Injection Technique:  Single shot  Procedures: ultrasound guided    Patient Position: supine  Prep: chlorhexidine    Location:  Mid thigh  Needle Type:  Stimuplex  Needle Gauge:  21 G  Needle Localization:  Ultrasound guidance  Medication Injected:  Ropivacaine (PF) (NAROPIN)(0.5%) 5 mg/mL injection, 25 mL  Med Admin Time: 1/25/2021 1:28 PM    Assessment:  Number of attempts:  1  Injection Assessment:  Incremental injection every 5 mL, negative aspiration for CSF, no paresthesia, ultrasound image on chart, local visualized surrounding nerve on ultrasound, negative aspiration for blood and no intravascular symptoms  Patient tolerance:  Patient tolerated the procedure well with no immediate complications  Patient able to straight leg raise  right leg after block. No sensory or motor block.

## 2021-01-25 NOTE — ROUTINE PROCESS
TRANSFER - IN REPORT: 
 
[facility] aYir Marroquin  being received from PACU (unit) for routine post - op Report consisted of patients Situation, Background, Assessment and  
Recommendations(SBAR). Information from the following report(s) SBAR, Kardex, OR Summary, Intake/Output and MAR was reviewed with the receiving nurse. Opportunity for questions and clarification was provided. Assessment to be completed upon patients arrival to unit and care assumed.

## 2021-01-25 NOTE — PERIOP NOTES
Notified China Dallas RN to make Dr Nisha Olivia aware patient had a fall on Friday. Patient states she hit her left eye, and small cut above lip on left, but did not lose consciousness. Patient was not evaluated after fall.

## 2021-01-25 NOTE — PERIOP NOTES
Reviewed PTA medication list with patient/caregiver and patient/caregiver denies any additional medications. Patient admits to having a responsible adult care for them at home for at least 24 hours after surgery. Patient encouraged to use gown warming system and informed that using said warming gown to regulate body temperature prior to a procedure has been shown to help reduce the risks of blood clots and infection. Patient's pharmacy of choice verified and documented in PTA medication section. Dual skin assessment & fall risk band verification completed with ROXANA Hightower RN.      Physical assessment completed by Padmini Chun RN

## 2021-01-25 NOTE — INTERVAL H&P NOTE
Update History & Physical 
 
The Patient's History and Physical of January 2021,  
hx was reviewed with the patient and I examined the patient. There was no change. The surgical site was confirmed by the patient and me. Plan:  The risk, benefits, expected outcome, and alternative to the recommended procedure have been discussed with the patient. Patient understands and wants to proceed with the procedure.  
 
Electronically signed by Kennedi Slaughter MD on 1/25/2021 at 2:15 PM

## 2021-01-25 NOTE — PERIOP NOTES
Notified Carol Cowan RN in holding that patient has redness to inner groin and under panus. She states she will notify Dr. Josias Can.

## 2021-01-26 PROBLEM — I48.91 ATRIAL FIBRILLATION (HCC): Status: ACTIVE | Noted: 2021-01-26

## 2021-01-26 PROBLEM — K92.2 GI BLEED: Status: ACTIVE | Noted: 2021-01-26

## 2021-01-26 PROBLEM — I95.9 HYPOTENSION: Status: ACTIVE | Noted: 2021-01-26

## 2021-01-26 LAB
ANION GAP SERPL CALC-SCNC: 5 MMOL/L (ref 3–18)
BUN SERPL-MCNC: 20 MG/DL (ref 7–18)
BUN/CREAT SERPL: 23 (ref 12–20)
CALCIUM SERPL-MCNC: 8.4 MG/DL (ref 8.5–10.1)
CHLORIDE SERPL-SCNC: 106 MMOL/L (ref 100–111)
CO2 SERPL-SCNC: 29 MMOL/L (ref 21–32)
CREAT SERPL-MCNC: 0.87 MG/DL (ref 0.6–1.3)
GLUCOSE SERPL-MCNC: 204 MG/DL (ref 74–99)
HCT VFR BLD AUTO: 37.1 % (ref 35–45)
HEMOCCULT STL QL: POSITIVE
HGB BLD-MCNC: 12 G/DL (ref 12–16)
INR PPP: 1.2 (ref 0.8–1.2)
POTASSIUM SERPL-SCNC: 4.4 MMOL/L (ref 3.5–5.5)
PROTHROMBIN TIME: 15.2 SEC (ref 11.5–15.2)
SODIUM SERPL-SCNC: 140 MMOL/L (ref 136–145)

## 2021-01-26 PROCEDURE — 82272 OCCULT BLD FECES 1-3 TESTS: CPT

## 2021-01-26 PROCEDURE — 97530 THERAPEUTIC ACTIVITIES: CPT

## 2021-01-26 PROCEDURE — 80048 BASIC METABOLIC PNL TOTAL CA: CPT

## 2021-01-26 PROCEDURE — 74011000250 HC RX REV CODE- 250: Performed by: FAMILY MEDICINE

## 2021-01-26 PROCEDURE — 97535 SELF CARE MNGMENT TRAINING: CPT

## 2021-01-26 PROCEDURE — 74011250637 HC RX REV CODE- 250/637: Performed by: PHYSICIAN ASSISTANT

## 2021-01-26 PROCEDURE — 74011250636 HC RX REV CODE- 250/636: Performed by: PHYSICIAN ASSISTANT

## 2021-01-26 PROCEDURE — 74011250636 HC RX REV CODE- 250/636: Performed by: FAMILY MEDICINE

## 2021-01-26 PROCEDURE — 97167 OT EVAL HIGH COMPLEX 60 MIN: CPT

## 2021-01-26 PROCEDURE — 36415 COLL VENOUS BLD VENIPUNCTURE: CPT

## 2021-01-26 PROCEDURE — 97163 PT EVAL HIGH COMPLEX 45 MIN: CPT

## 2021-01-26 PROCEDURE — 74011250637 HC RX REV CODE- 250/637: Performed by: ANESTHESIOLOGY

## 2021-01-26 PROCEDURE — 74011250637 HC RX REV CODE- 250/637: Performed by: ORTHOPAEDIC SURGERY

## 2021-01-26 PROCEDURE — 85018 HEMOGLOBIN: CPT

## 2021-01-26 PROCEDURE — C9113 INJ PANTOPRAZOLE SODIUM, VIA: HCPCS | Performed by: FAMILY MEDICINE

## 2021-01-26 PROCEDURE — 85610 PROTHROMBIN TIME: CPT

## 2021-01-26 RX ORDER — ENOXAPARIN SODIUM 100 MG/ML
30 INJECTION SUBCUTANEOUS EVERY 12 HOURS
Qty: 14 SYRINGE | Refills: 0 | Status: CANCELLED | OUTPATIENT
Start: 2021-01-26

## 2021-01-26 RX ORDER — WARFARIN 1 MG/1
1 TABLET ORAL DAILY
Status: CANCELLED | OUTPATIENT
Start: 2021-01-26

## 2021-01-26 RX ORDER — HYDROCODONE BITARTRATE AND ACETAMINOPHEN 5; 325 MG/1; MG/1
1-2 TABLET ORAL
Qty: 40 TAB | Refills: 0 | Status: CANCELLED | OUTPATIENT
Start: 2021-01-26 | End: 2021-01-31

## 2021-01-26 RX ADMIN — PANTOPRAZOLE SODIUM 80 MG: 40 INJECTION, POWDER, FOR SOLUTION INTRAVENOUS at 22:10

## 2021-01-26 RX ADMIN — VANCOMYCIN HYDROCHLORIDE 2000 MG: 10 INJECTION, POWDER, LYOPHILIZED, FOR SOLUTION INTRAVENOUS at 12:54

## 2021-01-26 RX ADMIN — ALPRAZOLAM 0.5 MG: 0.5 TABLET ORAL at 22:20

## 2021-01-26 RX ADMIN — LEVOTHYROXINE, LIOTHYRONINE 60 MG: 19; 4.5 TABLET ORAL at 08:58

## 2021-01-26 RX ADMIN — WARFARIN SODIUM 3 MG: 1 TABLET ORAL at 00:26

## 2021-01-26 RX ADMIN — CARVEDILOL 25 MG: 12.5 TABLET, FILM COATED ORAL at 10:06

## 2021-01-26 RX ADMIN — ENOXAPARIN SODIUM 30 MG: 30 INJECTION SUBCUTANEOUS at 06:54

## 2021-01-26 RX ADMIN — SPIRONOLACTONE 12.5 MG: 25 TABLET ORAL at 12:54

## 2021-01-26 RX ADMIN — MOXIFLOXACIN 400 MG: 400 TABLET, FILM COATED ORAL at 08:59

## 2021-01-26 RX ADMIN — CARVEDILOL 25 MG: 12.5 TABLET, FILM COATED ORAL at 17:47

## 2021-01-26 RX ADMIN — SODIUM CHLORIDE 500 ML: 900 INJECTION, SOLUTION INTRAVENOUS at 22:00

## 2021-01-26 RX ADMIN — HYDROMORPHONE HYDROCHLORIDE 0.5 MG: 1 INJECTION, SOLUTION INTRAMUSCULAR; INTRAVENOUS; SUBCUTANEOUS at 21:13

## 2021-01-26 RX ADMIN — ALPRAZOLAM 0.5 MG: 0.5 TABLET ORAL at 00:26

## 2021-01-26 RX ADMIN — VANCOMYCIN HYDROCHLORIDE 2000 MG: 10 INJECTION, POWDER, LYOPHILIZED, FOR SOLUTION INTRAVENOUS at 00:26

## 2021-01-26 RX ADMIN — MONTELUKAST 10 MG: 10 TABLET, FILM COATED ORAL at 12:54

## 2021-01-26 RX ADMIN — HYDROMORPHONE HYDROCHLORIDE 0.5 MG: 1 INJECTION, SOLUTION INTRAMUSCULAR; INTRAVENOUS; SUBCUTANEOUS at 01:52

## 2021-01-26 RX ADMIN — SODIUM CHLORIDE, SODIUM LACTATE, POTASSIUM CHLORIDE, AND CALCIUM CHLORIDE 75 ML/HR: 600; 310; 30; 20 INJECTION, SOLUTION INTRAVENOUS at 00:26

## 2021-01-26 RX ADMIN — Medication 10 ML: at 22:00

## 2021-01-26 RX ADMIN — Medication 10 ML: at 06:57

## 2021-01-26 RX ADMIN — OXYCODONE HYDROCHLORIDE 5 MG: 5 TABLET ORAL at 17:47

## 2021-01-26 NOTE — PROGRESS NOTES
Pharmacy Dosing Services: Warfarin    Consult for Warfarin Dosing by Pharmacy by Dr. Monet Espinoza provided for this 69 y.o.  female , for indication of Venous Thrombosis Day of Therapy continuing home med  Dose to achieve an INR goal of 2-3    Order entered for  Warfarin  3 (mg) ordered to be given today  . Significant drug interactions:    Previous dose given     PT/INR Lab Results   Component Value Date/Time    INR 1.1 01/25/2021 12:00 PM      Platelets Lab Results   Component Value Date/Time    PLATELET 313 71/29/7171 02:41 PM      H/H Lab Results   Component Value Date/Time    HGB 14.7 01/07/2021 02:41 PM        Pharmacy to follow daily and will provide subsequent Warfarin dosing based on clinical status.   [facility] [place])  Contact information [phone]

## 2021-01-26 NOTE — PROGRESS NOTES
1930 - Received patient from PACU in satisfactory condition. VSS. Assumed care of patient. Dual skin assessment completed with Brenda Boo RN. No pressure areas noted. Fall risk band in place. 1955 - Assessment completed. Patient is alert and oriented x 4. Patient is calm and cooperative. Denies numbness or tingling to BLE. Pedal pulses palpable and equal bilaterally. Capillary Refill < 3 seconds. Lung sounds clear bilaterally. Respirations even and unlabored. No use of accessory muscles. Educated on incentive spirometer and demonstrated proper use. Abdomen is soft and non-tender. Bowel sounds active to all quadrants. Patient assisted onto bedpan and voided post-operatively. No bladder distention evident. No complaints of bladder discomfort. Skin is warm, dry and skin color is appropriate to race. Ace wrap dressing to RLE noted CDI. No other skin integrity issues present. Jonn hose applied to LLE. Plexi's applied to bilateral feet. IV intact to right hand and infusing without difficulty. Reports pain 3/10 and tolerable. Patient oriented to call bell use as well as bed use. Patient oriented to phone and how to order meals. Call bell within reach. Bed in low position. Three side rails up. 0864 - Patient assisted onto bedside commode, voided, and returned to bed. Call light in reach.

## 2021-01-26 NOTE — ROUTINE PROCESS
Bedside and verbal shift change report given to [address] [place] [address] by Bibi Najera RN. Report included SBAR, kardex, MAR, and recent results.

## 2021-01-26 NOTE — PROGRESS NOTES
0142-Sitting up on bedside commode with CNA at bedside. Patient stating that she has to urinate frequently and getting up to bedside commode. Anxious. Stable. Ace wrap, right knee, intact with ice pack in place. Assessment complete.  [email or web address] LPM via nasal cannula. CPAP at bedside. Patient frequently getting up stating that she has to have a bowel movement. Assessment complete. 0250-Formed stool noted, hard. Stable. 0620-No change from initial assessment. Resting in bed. Shift Summary:  Stool to be tested for occult blood. Stable at shift change report. Monitoring stools to obtain sample.

## 2021-01-26 NOTE — PROGRESS NOTES
Problem: Mobility Impaired (Adult and Pediatric)  Goal: *Acute Goals and Plan of Care (Insert Text)  Description: In 1-5 days pt will be able to perform:  ST.  Bed mobility:  Rolling L to R to L modified independent for positioning. 2.  Supine to sit to supine CGA with HR for meals. 3.  Sit to stand to sit CGA with RW in prep for ambulation. LT.  Gait:  Ambulate >50ft CGA with RW, WBAT, for home/community mobility. 2.  Stair Negotiation:  Ascend/descend >1 step CGA with HR for home entry. 3.  Activity tolerance: Tolerate up in chair 1-2 hours for ADL's.  4.  Patient/Family Education:  Patient/family to be independent with HEP for follow-up care and safe discharge. Note: []  Patient has met MD sandra kemp for d/c home   []  Recommend HH with 24 hour adult care   [x]  Benefit from additional acute PT session to address:  progress transfer training, gait training    PHYSICAL THERAPY EVALUATION    Patient: Sanaz Clark (74 y.o. female)  Date: 2021  Primary Diagnosis: Osteoarthritis of right knee [M17.11]  Procedure(s) (LRB):  RIGHT TOTAL KNEE REPLACEMENT (Right) 1 Day Post-Op   Precautions:   Fall, WBAT    PLOF: Limited ambulation, primarily stayed on couch, transferred only to [place] :  Based on the objective data described below, the patient presents with decreased mobility in regards to bed mobility, transfers, gt quality and tolerance, balance, stair negotiation and safety due to R TKA surgery. Decreased AROM of R knee, dec strength of R knee, pain in R knee, limited motivation also impacting pt functional mobility. Pt rating pain on numerical pain scale pre/post and during session 5/10. Pt  ed regarding mobility safety, WB, HEP, ice application/use, elevation, environmental safety, need to move self around in bed and need to use call bell for OOB activity. Pt able to perform supine<>sit w/ max A/total A x2 w/ additional time.   Safety vc required throughout session to reinforce safety. When providing pt w/ A pt totally stops helping and needs frequent vc to participate. Pt required mod/max A/total A for sitting EOB as pt leaning to L and posteriorly due to \"pain in my hip. \"  Poor tolerance for sitting and pt shared that she was not a frequent  at home, transferring to [facility] stand pivot mostly. Answered questions by pt in regards to PT and mobility. Pt left supine in bed w/ all needs within reach, B SCD reapplied and ice pack to R knee. Nurse Hansel falls aware of session and outcomes. Recommend pt to use bedpan and not to get OOB due to high level of A. Recommend rehab upon hospital d/c. Patient will benefit from skilled intervention to address the above impairments. Patient's rehabilitation potential is considered to be Fair  Factors which may influence rehabilitation potential include:   []         None noted  [x]         Mental ability/status  [x]         Medical condition  [x]         Home/family situation and support systems  [x]         Safety awareness  [x]         Pain tolerance/management  []         Other:      PLAN :  Recommendations and Planned Interventions:   [x]           Bed Mobility Training             []    Neuromuscular Re-Education  [x]           Transfer Training                   []    Orthotic/Prosthetic Training  [x]           Gait Training                          [x]    Modalities  [x]           Therapeutic Exercises           [x]    Edema Management/Control  [x]           Therapeutic Activities            [x]    Family Training/Education  [x]           Patient Education  []           Other (comment):    Frequency/Duration: Patient will be followed by physical therapy 1-2 times per day/4-7 days per week to address goals. Discharge Recommendations: Rehab  Further Equipment Recommendations for Discharge: N/A     SUBJECTIVE:   Patient stated I don't move much.     OBJECTIVE DATA SUMMARY:     Past Medical History:   Diagnosis Date Arrhythmia     a-fib    Arthritis     Cancer ([facility] 2011    endometrial     basal skin    Chronic obstructive pulmonary disease (HCC)     no inhaler    Diabetes (HCC)     no meds at present 7-15-20    Heart failure ([facility] 03/2014    CHF    Hypertension 2000    Morbid obesity ([facility]     Psychiatric disorder     depression    Sleep apnea     instructed to bring morning of surgery    Thyroid disease      Past Surgical History:   Procedure Laterality Date    HX ADENOIDECTOMY      HX HYSTERECTOMY      HX OOPHORECTOMY      HX TONSILLECTOMY       Barriers to Learning/Limitations: yes;  physical  Compensate with: Visual Cues, Verbal Cues, and Tactile Cues  Home Situation:  Home Situation  Home Environment: Trailer/mobile home  # Steps to Enter: 5  Rails to Enter: Yes  Hand Rails : Bilateral  One/Two Story Residence: One story  Lift Chair Available: No  Living Alone: Yes  Support Systems: Child(rory), Friends \ neighbors  Patient Expects to be Discharged to[de-identified] Private residence(going to friends home no entry step)  Current DME Used/Available at Home: Cane, straight, Walker, rolling  Critical Behavior:  Neurologic State: Drowsy  Orientation Level: Oriented X4  Cognition: Follows commands  Safety/Judgement: Decreased awareness of environment;Decreased awareness of need for assistance;Decreased awareness of need for safety;Decreased insight into deficits  Psychosocial  Patient Behaviors: Lethargic  Purposeful Interaction: Yes  Pt Identified Daily Priority: Clinical issues (comment)  Caritas Process: Nurture loving kindness;Enable maren/hope;Establish trust;Nurture spiritual self;Teaching/learning;Create healing environment; Attend basic human needs  Caring Interventions: Reassure  Reassure: Therapeutic listening; Informing; Acceptance  Therapeutic Modalities: Intentional therapeutic touch;Humor  Skin Condition/Temp: Warm  Skin Integrity: Incision (comment)(R knee)  Skin Integumentary  Skin Color: Appropriate for ethnicity  Skin Condition/Temp: Warm  Skin Integrity: Incision (comment)(R knee)  Strength:    Strength: Generally decreased, functional  Tone & Sensation:   Tone: Normal  Sensation: Intact  Range Of Motion:  AROM: Generally decreased, functional  Functional Mobility:  Bed Mobility:  Supine to Sit: Maximum assistance; Total assistance;Assist x2; Additional time(vc)  Sit to Supine: Total assistance;Assist x2(vc)  Scooting: Total assistance;Assist x2([facility])  Transfers:  Balance:   Sitting: Impaired; With support  Sitting - Static: Poor (constant support); Fair (occasional)  Wheelchair Mobility:  Ambulation/Gait Training:  Right Side Weight Bearing: As tolerated  Therapeutic Exercises:   Encouraged HEP  Pain:  Pain level pre-treatment: 5/10   Pain level post-treatment: 5/10   Pain Intervention(s) : Medication (see MAR); Rest, Ice, Repositioning  Response to intervention: Nurse notified, See doc flow    Activity Tolerance:   Poor   Please refer to the flowsheet for vital signs taken during this treatment. After treatment:   []         Patient left in no apparent distress sitting up in chair  [x]         Patient left in no apparent distress in bed  [x]         Call bell left within reach  [x]         Nursing notified  []         Caregiver present  []         Bed alarm activated  [x]         SCDs applied    COMMUNICATION/EDUCATION:   [x]         Role of Physical Therapy in the acute care setting. [x]         Fall prevention education was provided and the patient/caregiver indicated understanding. [x]         Patient/family have participated as able in goal setting and plan of care. [x]         Patient/family agree to work toward stated goals and plan of care. []         Patient understands intent and goals of therapy, but is neutral about his/her participation. []         Patient is unable to participate in goal setting/plan of care: ongoing with therapy staff.  []         Other:     Thank you for this referral.  Marietat Palma Jolynn, PT   Time Calculation: 29 mins      Eval Complexity: History: HIGH Complexity :3+ comorbidities / personal factors will impact the outcome/ POC Exam:HIGH Complexity : 4+ Standardized tests and measures addressing body structure, function, activity limitation and / or participation in recreation  Presentation: HIGH Complexity : Unstable and unpredictable characteristics  Clinical Decision Making:High Complexity    Overall Complexity:HIGH

## 2021-01-26 NOTE — ROUTINE PROCESS
Bedside and Verbal shift change report given to Ashish Garcia RN (oncoming nurse) by aNima Harvey RN (offgoing nurse). Report included the following information SBAR and Kardex.

## 2021-01-26 NOTE — ROUTINE PROCESS
0740 
Bedside shift change report given to [id number] [place], [place] [address]leonardo Fox (oncoming nurse) by Lavell Torres RN (offgoing nurse). Report included the following information SBAR, Kardex, Intake/Output and MAR.

## 2021-01-26 NOTE — PROGRESS NOTES
Care manager rounded on patient to verify [facility] choice; [address] offerred and patient verified that [address] was her choice.

## 2021-01-26 NOTE — PROGRESS NOTES
PT orders received and chart reviewed. Attempted PT eval however pt non compliant, answering questions w/ short answers or not answering at all, sleeping through conversation attempt and turning head away from PT keeping eyes closed in avoidance. Explained role of acute PT. Will return later as pt willing to participate. Nurse Serena galvez.

## 2021-01-26 NOTE — PERIOP NOTES
TRANSFER - OUT REPORT:    Verbal report given to [facility]. RN on [employer]  being transferred to [facility] for routine post - op       Report consisted of patients Situation, Background, Assessment and   Recommendations(SBAR). Information from the following report(s) SBAR, Procedure Summary, Intake/Output and MAR was reviewed with the receiving nurse. Lines:   Peripheral IV 01/25/21 Posterior;Right Hand (Active)   Site Assessment Clean, dry, & intact 01/25/21 1244   Phlebitis Assessment 0 01/25/21 1244   Dressing Status Clean, dry, & intact 01/25/21 1244   Dressing Type Tape;Transparent 01/25/21 1244   Hub Color/Line Status Infusing;Green 01/25/21 1244   Alcohol Cap Used No 01/25/21 1244        Opportunity for questions and clarification was provided.       Patient transported with:   O2 @ 3 liters  Registered Nurse  [facility]

## 2021-01-26 NOTE — PROGRESS NOTES
Progress Note     Patient: Kailey Sarah MRN: [id number]  SSN: [id number]    YOB: 1950  Age: 69 y.o. Sex: female      POD:    1 Day Post-Op  S/P:    Procedure(s):  RIGHT TOTAL KNEE REPLACEMENT     Subjective:   Pt is without complaints of pain. Objective:     Patient Vitals for the past 12 hrs:   BP Temp Pulse Resp SpO2   01/26/21 0432 95/67 98.3 °F (36.8 °C) 97 18 98 %   01/25/21 2350 121/79 98 °F (36.7 °C) 97 16 100 %   01/25/21 2008 106/66 98 °F (36.7 °C) 75 16 97 %   01/25/21 1920 101/67 98.1 °F (36.7 °C) 72 16      Recent Labs     01/26/21  0153 01/25/21  2045   HGB  --  12.8   HCT  --  40.2   INR 1.2  --      --    K 4.4  --      --    CO2 29  --    BUN 20*  --    CREA 0.87  --    *  --        Pt resting in bed. Lower extremity operative dressing clean/dry/intact. Neurovascularly intact. Assessment:     Awake and alert. In good spirits. X rays satisfactory. Probable discharge today. Plan:   1. Continue pain management/ ice to operative area. 2. Continue to progress with PT/OT. 3. Discharge planning to home with home health.

## 2021-01-26 NOTE — PROGRESS NOTES
Progress Note     Patient: Beto Plata MRN: [id number]  SSN: [id number]    YOB: 1950  Age: 69 y.o. Sex: female      POD:    1 Day Post-Op  S/P:    Procedure(s):  RIGHT TOTAL KNEE REPLACEMENT     Subjective:   Pt is without complaints of pain. *Patient with nursing staff currently. Patient was constipated and then had bowel movement in the bed. Per Abhijit Lynch, there was a harder formed stool and then unformed stool that followed that had a reddish appearance. Patient has remote history of hemorrhoids. She reports history of kidney stones that have caused blood in urine as well. Objective:     Patient Vitals for the past 12 hrs:   BP Temp Pulse Resp SpO2   01/26/21 0831 (!) 103/53 99.3 °F (37.4 °C) 86 18 98 %   01/26/21 0432 95/67 98.3 °F (36.8 °C) 97 18 98 %   01/25/21 2350 121/79 98 °F (36.7 °C) 97 16 100 %     Recent Labs     01/26/21  0153 01/25/21  2045   HGB  --  12.8   HCT  --  40.2   INR 1.2  --      --    K 4.4  --      --    CO2 29  --    BUN 20*  --    CREA 0.87  --    *  --        Pt. resting in bed. Lower extremity operative dressing intact with blood shadow. Neurovascularly intact. Assessment:     Awake and alert. In good spirits. Plan:   1. Continue pain management/ ice to operative area. 2. Continue to progress with PT/OT. 3. Discharge planning to home with home health today as long as she passes PT and her pain remains well controlled.   4. Ordered fecal occult blood test to be performed--will follow if positive

## 2021-01-26 NOTE — OP NOTES
[address] [facility] [address]   Right Total Knee Arthroplasty          Date of Surgery: 1/25/2021   Preoperative Diagnosis: UNILATERAL PRIMARY OSTEOARTHRITIS RIGHT KNEE   Postoperative Diagnosis: UNILATERAL PRIMARY OSTEOARTHRITIS RIGHT KNEE   Location: [facility]  Surgeon: Ada Christianson MD  Assistant:  Trisha THAKUR  Anesthesia: Spinal and Adductor Canal Block    Procedure: Right Total Knee Arthroplasty    Findings:  Degenerative joint disease of the right knee. Estimated Blood Loss:  150 cc    Specimens: None    Complications: None    Implants:   Implant Name Type Inv. Item Serial No.  Lot No. LRB No. Used Action   COMPONENT PAT [id number] SUPERIOR/INFERIOR KNEE - [id number]  COMPONENT PAT [id number] SUPERIOR/INFERIOR KNEE  [employer] [id number] Right 1 Implanted   BASEPLATE TIB SZ 6 [id number] ML77MM KNEE TRITANIUM 4 CRUCFRM - [id number]  BASEPLATE TIB SZ 6 [id number] ML77MM KNEE TRITANIUM 4 CRUCFRM  [employer] [id number] Right 1 Implanted   COMPONENT FEM SZ 6 R KNEE CRUCE RET CEMENTLESS BEAD W/ CHLOÉ - [id number]  COMPONENT FEM SZ 6 R KNEE CRUCE RET CEMENTLESS BEAD W/ CHLOÉ  [employer] [id number] Right 1 Implanted   tibial bearing insert cs    [employer] [id number] Right 1 Implanted       OPERATIVE PROCEDURE IN DETAIL: The patient was taken to the operating room, placed in supine position on the operating table. After satisfactory general anesthesia was established, tourniquet was placed on the right thigh. The right leg was prepped with ChloraPrep and draped in a sterile fashion. A time-out was accomplished. Esmarch bandage was used to exsanguinate the leg. Tourniquet was inflated to 350 mmHg. Anterior midline incision was made, length of approximately 6 inches. Incision was made through the skin and subcutaneous tissue. Medial parapatellar incision was made. The patella was everted and held with towel clips.   The thickness was measured at 24 mm. The preliminary cut was made using the oscillating saw. The final preparation was not done at this time. Attention was directed to the femur. Osteophytes were removed as they were encountered. Drill hole was made in the depth of the intercondylar notch. This was followed by the intramedullary dinora with the distal cut guide. The guide was held in place with 2 pins. Remaining jigs were removed and the distal femur was cut at this time. The femur was measured and noted to be the size 6. The multi cut femoral block was placed on the distal femur, held in place with 2 pegs and 2 pins. The , anterior, posterior, posterior chamfer, and anterior chamfer cuts were made at this time. The remaining pins and jig were removed at this time. Bone was removed using an osteotome. Attention was directed to the tibia. Any remaining meniscal components were removed. The posterior and lateral retractors were placed. Care being taken to avoid excess pressure on the lateral retractor. The extramedullary tibial guide system was used. It was held in position and adjusted for alignment. The cutting guide was measured at approximately 9 mm off the high side. The cutting block was pinned in place. The remaining jigs were removed. The alignment dinora was placed on the tibial cutting block to help with alignment. The proximal tibia was cut at this time. The bone was removed. The spacer block was used and was satisfactory in flexion and extension. The remaining meniscal fragments were removed at this time. Exparel was placed in the wound edges and the periosteum. The tibia was sized and noted to be the size indicated above. The trial tibial baseplate with was placed at this time. The tibial baseplate was fixed with two pins. The tibial trial poly was placed on the baseplate. The trial femoral component was impacted into position.   The knee was placed through a range of motion which was noted to be satisfactory in flexion and extension. Good stability was noted in both flexion and extension. Attention was directed back to the patella. The patella was again everted a maximum of 10 mm of patella was removed from the initial measurement with the measurement now being 14 mm. The patella was sized and noted to be the size indicated above. The lug holes were drilled at this time. The knee was placed in flexion. The femoral lug holes were drilled. The guide for the tibial finned punch was placed and the finned punch was use at this time. The finned punch was removed and the guide for the tibial pegs was placed. All 4 peg holes were made. Any sclerotic areas were multiply drilled. Pulse lavage irrigation was used at this time. The tibial base plate was impacted into position. The polyethylene component was placed and impacted into position. The femoral component was impacted into position. The knee was placed in extension. The tourniquet was deflated. The patella was then placed and compressed with a clamp. The knee was checked in flexion and extension for stability in varus and valgus stress. The patella tracked well without the need for lateral release. The knee was irrigated with betadine irrigation followed by saline. The parapatellar incision was closed using interrupted #1 Vicryl figure-of-eight sutures followed by the Stratafix. The knee was injected with 4 mg MS, 30 mg toradol and 30cc of .5% marcaine with epi, and TXA - the knee was water tight. Subcutaneous tissue was closed using 2-0 Monocryl and the skin was closed with a subcuticular 3-0 Monocryl. The Exofin system was used, followed by a Mepilex dressing. The patient tolerated the procedure well, was awakened from anesthesia, transferred onto the recovery room bed and taken to recovery room in stable condition.      Dell Salguero MD 1/25/2021 7:55 PM

## 2021-01-26 NOTE — PROGRESS NOTES
Problem: Self Care Deficits Care Plan (Adult)  Goal: *Acute Goals and Plan of Care (Insert Text)  Description: Initial Occupational Therapy Goals (1/26/2021) Within 7 day(s):    1. Patient will perform grooming seated on EOB with setup for increased independence in ADLs. 2. Patient will perform UB dressing with setup seated EOB for increased independence with ADLs. 3. Patient will perform LB dressing with Mod A & A/E PRN for increased independence with ADLs. 4. Patient will perform all aspects of toileting with Mod A for increased independence with ADLs. 5. Patient will perform LB ADLs utilizing body mechanics & adaptive strategies with 1 verbal cue for increased safety in ADLs. 6. Patient will independently apply energy conservation techniques with 2 verbal cue(s)for increased independence with ADLs. 1/26/2021 1526 by Raegan Disla  Outcome: Progressing Towards Goal  OCCUPATIONAL THERAPY EVALUATION    Patient: Sherlie Lombard (70 y.o. female)  Date: 1/26/2021  Primary Diagnosis: Osteoarthritis of right knee [M17.11]  Procedure(s) (LRB):  RIGHT TOTAL KNEE REPLACEMENT (Right) 1 Day Post-Op   Precautions:  Fall, WBAT  PLOF: pt reported that she slept on the couch, she transferred to [facility] and chair positioned diagonally from couch, she needed assistance with ADL's     ASSESSMENT AND RECOMMENDATIONS:  Based on the objective data described below, the patient presents with decreased ROM and strength affecting LE ADLs. Pt received in supine. Pt rated her pain level a 5/10 at rest. PT and OT did co-eval with pt for a 2nd set of skilled hands to ensure pt's safety with OOB activity. Pt was very drowsy upon arrival in her room but was A&O x 4. Pt reported that she is going to be staying at her friend's house at [place] and her son will be assisting her as needed at [place]. Pt reported that she didn't walk at home and she slept on the couch and transferred to [facility] or chair that was placed diagonally across from the couch. Pt said that she needed assistance with ADL's. Pt required 2 person Max-total A for supine to sit. During supine to sit, pt screamed out in pain. OT noticed that pt was on the bedpan and pt didn't verbalize it to therapists. Pt required total A to remove bedpan. Pt required Max/total x 2 for sitting balance and then progressed to Mod/Min A for sitting balance. Pt leaned posteriorly and to the L due to R hip pain. Pt wasn't able to scoot on the EOB and required total A x 2 to get back into bed. Pt required total A to scoot to top of bed in trendelenburg position. Pt left resting in bed with call light in reach. OT discussed rehab with pt at OR and she was hesitant due to [place]. Pt will require extensive assistance at OR and will benefit from using bed pan in the hospital due to needing extensive assistance for mobility. Patient will benefit from skilled Occupational Therapy intervention to increase independence with ADL's and transfers. Education: Reviewed home safety, body mechanics, importance of moving every hour to prevent joint stiffness, role of ice for edema/pain control, Rolling Walker management/safety, and adaptive dressing techniques with patient verbalizing  understanding at this time     Patient will benefit from skilled intervention to address the above impairments.   Patient's rehabilitation potential is considered to be Guarded  Factors which may influence rehabilitation potential include:   []             None noted  []             Mental ability/status  [x]             Medical condition  [x]             Home/family situation and support systems  [x]             Safety awareness  [x]             Pain tolerance/management  []             Other:        PLAN :  Recommendations and Planned Interventions:   [x]               Self Care Training                  [x]      Therapeutic Activities  [x]               Functional Mobility Training   []      Cognitive Retraining  [x]               Therapeutic Exercises           [x]      Endurance Activities  [x]               Balance Training                    []      Neuromuscular Re-Education  []               Visual/Perceptual Training     [x]      Home Safety Training  [x]               Patient Education                   [x]      Family Training/Education  []               Other (comment):    Frequency/Duration: Patient will be followed by Occupational Therapy 1-2 times per day/4-7 days per week to address goals. Discharge Recommendations: Rehab with responsible adult care at least 24 hours upon hospital d/c  Further Equipment Recommendations for Discharge: TBD at next level of care     SUBJECTIVE:   Patient stated i want to go home. I'm afraid of the COVID.     OBJECTIVE DATA SUMMARY:     Past Medical History:   Diagnosis Date    Arrhythmia     a-fib    Arthritis     Cancer ([facility] [address] 2011    endometrial     basal skin    Chronic obstructive pulmonary disease (HCC)     no inhaler    Diabetes ([facility] [address]     no meds at present 7-15-20    Heart failure ([facility] [address] 03/2014    CHF    Hypertension 2000    Morbid obesity ([facility] [address]     Psychiatric disorder     depression    Sleep apnea     instructed to bring morning of surgery    Thyroid disease      Past Surgical History:   Procedure Laterality Date    HX ADENOIDECTOMY      HX HYSTERECTOMY      HX OOPHORECTOMY      HX TONSILLECTOMY       Barriers to Learning/Limitations: yes;  physical and altered mental status (i.e.Sedation, Confusion)  Compensate with: visual, verbal, tactile, kinesthetic cues/model    Home Situation/Prior Level of Function:   Home Situation  Home Environment: Trailer/mobile home  # Steps to Enter: 5  Rails to Enter: Yes  Hand Rails : Bilateral  One/Two Story Residence: One story  Lift Chair Available: No  Living Alone: Yes  Support Systems: Child(rory), Friends \ neighbors  Patient Expects to be Discharged to[de-identified] Private residence(going to friends home no entry step)  Current DME Used/Available at Home: Cane, straight, Walker, rolling  []  Right hand dominant   []  Left hand dominant    Cognitive/Behavioral Status:  Neurologic State: Drowsy  Orientation Level: Oriented X4  Cognition: Follows commands  Safety/Judgement: Decreased awareness of environment;Decreased awareness of need for assistance;Decreased awareness of need for safety;Decreased insight into deficits    Skin: R knee incision w/ Orthofoam  Edema: compression hose in place & applied ice     Coordination: BUE  Coordination: Within functional limits  Fine Motor Skills-Upper: Left Intact; Right Intact    Gross Motor Skills-Upper: Left Impaired;Right Intact    Balance:  Sitting: Impaired; With support  Sitting - Static: Poor (constant support); Fair (occasional)    Strength: BUE  Strength: Generally decreased, functional     Tone & Sensation:BUE  Tone: Normal  Sensation: Intact     Range of Motion: BUE  AROM: Generally decreased, functional     Functional Mobility and Transfers for ADLs:  Bed Mobility:  Supine to Sit: Maximum assistance; Total assistance;Assist x2; Additional time(vc)  Sit to Supine: Total assistance;Assist x2(vc)  Scooting: Total assistance;Assist x2([facility])    ADL Assessment:  Feeding: Setup  Oral Facial Hygiene/Grooming: Setup  Bathing: Maximum assistance; Total assistance  Upper Body Dressing: Maximum assistance  Lower Body Dressing: Maximum assistance; Total assistance  Toileting: Total assistance     ADL Intervention:   Cognitive Retraining  Safety/Judgement: Decreased awareness of environment;Decreased awareness of need for assistance;Decreased awareness of need for safety;Decreased insight into deficits    Pain:  Pain level pre-treatment: 5/10  Pain level post-treatment: 5/10  Pain Intervention(s): Medication administer by Nursing (see MAR); Rest, Ice, Repositioning   Response to intervention: Nurse notified, see doc flow     Activity Tolerance:   Poor. Patient able to sit on EOB ~1 minute(s) with support.  Patient limited by pain, decreased ROM, balance, strength, and safety awareness. Patient unsteady. Please refer to the flowsheet for vital signs taken during this treatment. After treatment:   []  Patient left in no apparent distress sitting up in chair  []  Patient sitting on EOB  [x]  Patient left in no apparent distress in bed  [x]  Call bell left within reach  [x]  Nursing notified  []  Caregiver present  [x]  Ice applied  []  SCD's on while back in bed  [] Bed alarm activated    COMMUNICATION/EDUCATION:   Communication/Collaboration:  [x]       Role of Occupational Therapy in the acute care setting. [x]      Home safety education was provided and the patient/caregiver indicated understanding. [x]      Patient/family have participated as able in goal setting and plan of care. [x]      Patient/family agree to work toward stated goals and plan of care. []      Patient understands intent and goals of therapy, but is neutral about his/her participation. []      Patient is unable to participate in plan of care at this time. Thank you for this referral.  Maral Loc Disla OTR/L MOT  Time Calculation: 29 mins    Eval Complexity: History: HIGH Complexity : Extensive review of history including physical, cognitive and psychosocial history ; Examination: HIGH Complexity : 5 or more performance deficits relating to physical, cognitive , or psychosocial skils that result in activity limitations and / or participation restrictions; Decision Making:HIGH Complexity : Patient presents with comorbidities that affect occupational performance.  Signifigant modification of tasks or assistance (eg, physical or verbal) with assessment (s) is necessary to enable patient to complete evaluation

## 2021-01-26 NOTE — PROGRESS NOTES
0730  During bedside report, patient linen was changed and patient cleaned. Patient had loose BM with red tint and scant jelly like texture. Abdomen is soft and not distended, but patient c/o some discomfort with palpation. David ASTUDILLO entered at bedside to observe stool. Ordered occult stool smear to check for blood. Ace dressing soiled with stool. Removed and placed edel. Mepilex dressing with scant old drainage. [address] [facility] at this time. Patient drowsy and oriented x4. Denies SOB, chest pain. Shows no signs of distress. Patient lungs diminished bilaterally. Cap refill < 3 sec to all extremities. Patient has 18 G IV to R hand CDI. Stated pain 6/10. Declines pain medication. Dressing to R knee is DI with moderate redness/bruising. Plexis and edel stockings applied to BLE. Incentive spirometer at bedside. Patient reaches 250 on IS. Bowel sounds present. Skin intact but excoriation is present in groin. Patient call light and possessions within reach. Bed locked and in lowest position. Nurse will continue to monitor. 80  Paged David ASTUDILLO. Patient stool positive for blood. [address] [facility] returned call. Will place hospitalist consult. Current nurse also spoke with admitting nurse---patient was not this drowsy at admission. Patient also c/o abdominal pain last night. Yovani Rowan made aware. 26  Paged Dr. Arnold Quale to notify of above. 1258  Patient alert and oriented x4. Patient had uneventful shift. Voiding sufficient amounts. Patient has not ambulated d/t weakness in R leg. Oncoming nurse aware of hospitalist consult to be completed. Patient more awake and able to follow commands with improved strength.

## 2021-01-26 NOTE — ROUTINE PROCESS
Bedside and Verbal shift change report given to Chelsea Larry RN (oncoming nurse) by Tor Rogers RN (offgoing nurse). Report included the following information SBAR and Kardex.

## 2021-01-26 NOTE — PROGRESS NOTES
Problem: Falls - Risk of  Goal: *Absence of Falls  Description: Document Miriam Carter Fall Risk and appropriate interventions in the flowsheet.   Outcome: Progressing Towards Goal  Note: Fall Risk Interventions:  Mobility Interventions: Patient to call before getting OOB, Utilize walker, cane, or other assistive device         Medication Interventions: Teach patient to arise slowly, Patient to call before getting OOB    Elimination Interventions: Patient to call for help with toileting needs, Call light in reach              Problem: Patient Education: Go to Patient Education Activity  Goal: Patient/Family Education  Outcome: Progressing Towards Goal     Problem: Knee Replacement: Day of Surgery/Unit  Goal: Activity/Safety  Outcome: Progressing Towards Goal  Goal: Consults, if ordered  Outcome: Progressing Towards Goal  Goal: Diagnostic Test/Procedures  Outcome: Progressing Towards Goal  Goal: Nutrition/Diet  Outcome: Progressing Towards Goal  Goal: Medications  Outcome: Progressing Towards Goal  Goal: Respiratory  Outcome: Progressing Towards Goal  Goal: Treatments/Interventions/Procedures  Outcome: Progressing Towards Goal  Goal: Psychosocial  Outcome: Progressing Towards Goal  Goal: *Initiate mobility  Outcome: Progressing Towards Goal  Goal: *Optimal pain control at patient's stated goal  Outcome: Progressing Towards Goal  Goal: *Hemodynamically stable  Outcome: Progressing Towards Goal     Problem: Pain  Goal: *Control of Pain  Outcome: Progressing Towards Goal Penile pain

## 2021-01-26 NOTE — PROGRESS NOTES
1550 Report received from Brendan Chau RN. Patient in satisfactory condition. Dsg. VS stable. Pain voiced at /10, will medicate per [facility] at patient request. IS educated and encouraged. Shift POC reviewed with patient. Menu/meal times explained. Call/bell phone within reach. Will monitor for changes. 1700 Received telephone call from son, security code provided and updates given. Son confirms he is able to provide his mother with 24 hours daily support while home. 1745 Pain voiced at 8/10, medicated per MAR. Ice at site. Will monitor for changes. 1910 Received telephone call from son, security code provided and updates given. Bedside and Verbal shift change report given to Mary Nielsen RN (oncoming nurse) by Aislinn Mesa RN (offgoing nurse). Report included the following information SBAR, Kardex, MAR, Recent Results and Med Rec Status.

## 2021-01-26 NOTE — PROGRESS NOTES
Occupational Therapy Evaluation/Treatment Attempt    Chart reviewed. Attempted Occupational Therapy Evaluation/Treatment, however, patient unable to be seen due to:  []  Nausea/vomiting  []  Eating  []  Pain  []  Patient too lethargic  []  Off Unit for testing/procedure  []  Dialysis treatment in progress   []  Telemetry Results  [x]  Other: Pt just got cleaned up from bowel movement in bed. RN reports pt likely going off floor for testing    Will follow up later as patient's schedule allows.    Thank you for this referral.  Kasandra Ventura, OTR/L, CSRS, CDCS, CFPS

## 2021-01-26 NOTE — PROGRESS NOTES
Patient transferred to room 210 via bed in stable condition, VSS. Dual skin assessment completed with Griffin Schneider RN, no abnormal findings.  Dressing to RT knee remains CDI.       01/25/21 1920   Modified Lurdes Score   Activity 2   Respiration 2   Circulation 2   Consciousness 2   O2 Saturation 1   Lurdes Score 9   Vital Signs   Temp 98.1 °F (36.7 °C)   Temp Source Axillary   Pulse (Heart Rate) 72   Resp Rate 16   /67

## 2021-01-26 NOTE — PROGRESS NOTES
1217 - Received call from Yoly Hawkins, instructed to hold lovenox and coumadin until patient is seen by hospitalist due to positive occult blood. 1250 - Assumed care of patient at this time. Patient in bed resting. Pain 2/10, no needs at this time. Patient with incontinent brief. Patient more alert than she was with prior nurse. Patient answers questions appropriately. At this time unsafe at this time to ambulate. 1420 - Per PT. Patient is not safe to mobilize and recommend bed pan and rehab.     1548 - Bedside and Verbal shift change report given to Saira Light RN by Faith Calloway RN. Report included the following information SBAR, Kardex, OR Summary, Intake/Output and MAR.

## 2021-01-26 NOTE — PROGRESS NOTES
Attempted PT eval at this time however pt still not fully awake or participatory to elicit safe eval.  Pt did answer a few questions and then after avoided PT. Will attempt later as pt able. Nurse Serena galvez.

## 2021-01-27 ENCOUNTER — APPOINTMENT (OUTPATIENT)
Dept: NUCLEAR MEDICINE | Age: 71
DRG: 982 | End: 2021-01-27
Attending: FAMILY MEDICINE
Payer: MEDICARE

## 2021-01-27 LAB
ALBUMIN SERPL-MCNC: 3.4 G/DL (ref 3.4–5)
ALBUMIN/GLOB SERPL: 1.3 {RATIO} (ref 0.8–1.7)
ALP SERPL-CCNC: 43 U/L (ref 45–117)
ALT SERPL-CCNC: 22 U/L (ref 13–56)
ANION GAP SERPL CALC-SCNC: 1 MMOL/L (ref 3–18)
ARTERIAL PATENCY WRIST A: YES
AST SERPL-CCNC: 12 U/L (ref 10–38)
ATRIAL RATE: 91 BPM
BASE DEFICIT BLD-SCNC: 1 MMOL/L
BASOPHILS # BLD: 0 K/UL (ref 0–0.1)
BASOPHILS NFR BLD: 0 % (ref 0–2)
BDY SITE: ABNORMAL
BILIRUB SERPL-MCNC: 1.1 MG/DL (ref 0.2–1)
BUN SERPL-MCNC: 24 MG/DL (ref 7–18)
BUN/CREAT SERPL: 33 (ref 12–20)
CALCIUM SERPL-MCNC: 7.8 MG/DL (ref 8.5–10.1)
CALCULATED R AXIS, ECG10: 75 DEGREES
CALCULATED T AXIS, ECG11: -114 DEGREES
CHLORIDE SERPL-SCNC: 107 MMOL/L (ref 100–111)
CK MB CFR SERPL CALC: NORMAL % (ref 0–4)
CK MB SERPL-MCNC: <1 NG/ML (ref 5–25)
CK SERPL-CCNC: 184 U/L (ref 26–192)
CO2 SERPL-SCNC: 33 MMOL/L (ref 21–32)
CREAT SERPL-MCNC: 0.72 MG/DL (ref 0.6–1.3)
DIAGNOSIS, 93000: NORMAL
DIFFERENTIAL METHOD BLD: ABNORMAL
EOSINOPHIL # BLD: 0 K/UL (ref 0–0.4)
EOSINOPHIL NFR BLD: 0 % (ref 0–5)
ERYTHROCYTE [DISTWIDTH] IN BLOOD BY AUTOMATED COUNT: 13.7 % (ref 11.6–14.5)
ERYTHROCYTE [DISTWIDTH] IN BLOOD BY AUTOMATED COUNT: 13.9 % (ref 11.6–14.5)
ERYTHROCYTE [DISTWIDTH] IN BLOOD BY AUTOMATED COUNT: 13.9 % (ref 11.6–14.5)
ERYTHROCYTE [DISTWIDTH] IN BLOOD BY AUTOMATED COUNT: 14 % (ref 11.6–14.5)
GAS FLOW.O2 O2 DELIVERY SYS: ABNORMAL L/MIN
GAS FLOW.O2 SETTING OXYMISER: 4 L/M
GLOBULIN SER CALC-MCNC: 2.6 G/DL (ref 2–4)
GLUCOSE BLD STRIP.AUTO-MCNC: 141 MG/DL (ref 70–110)
GLUCOSE BLD STRIP.AUTO-MCNC: 143 MG/DL (ref 70–110)
GLUCOSE BLD STRIP.AUTO-MCNC: 160 MG/DL (ref 70–110)
GLUCOSE SERPL-MCNC: 139 MG/DL (ref 74–99)
HCO3 BLD-SCNC: 24.7 MMOL/L (ref 22–26)
HCT VFR BLD AUTO: 25 % (ref 35–45)
HCT VFR BLD AUTO: 33.6 % (ref 35–45)
HCT VFR BLD AUTO: 35.3 % (ref 35–45)
HCT VFR BLD AUTO: 35.5 % (ref 35–45)
HGB BLD-MCNC: 10.4 G/DL (ref 12–16)
HGB BLD-MCNC: 11.2 G/DL (ref 12–16)
HGB BLD-MCNC: 11.2 G/DL (ref 12–16)
HGB BLD-MCNC: 7.7 G/DL (ref 12–16)
HISTORY CHECKED?,CKHIST: NORMAL
INR PPP: 1.3 (ref 0.8–1.2)
LYMPHOCYTES # BLD: 0.9 K/UL (ref 0.9–3.6)
LYMPHOCYTES # BLD: 1.1 K/UL (ref 0.9–3.6)
LYMPHOCYTES # BLD: 1.3 K/UL (ref 0.9–3.6)
LYMPHOCYTES # BLD: 1.4 K/UL (ref 0.9–3.6)
LYMPHOCYTES NFR BLD: 10 % (ref 21–52)
LYMPHOCYTES NFR BLD: 10 % (ref 21–52)
LYMPHOCYTES NFR BLD: 11 % (ref 21–52)
LYMPHOCYTES NFR BLD: 11 % (ref 21–52)
MCH RBC QN AUTO: 29.3 PG (ref 24–34)
MCH RBC QN AUTO: 29.6 PG (ref 24–34)
MCH RBC QN AUTO: 29.6 PG (ref 24–34)
MCH RBC QN AUTO: 30.1 PG (ref 24–34)
MCHC RBC AUTO-ENTMCNC: 30.8 G/DL (ref 31–37)
MCHC RBC AUTO-ENTMCNC: 31 G/DL (ref 31–37)
MCHC RBC AUTO-ENTMCNC: 31.5 G/DL (ref 31–37)
MCHC RBC AUTO-ENTMCNC: 31.7 G/DL (ref 31–37)
MCV RBC AUTO: 93.9 FL (ref 74–97)
MCV RBC AUTO: 94.6 FL (ref 74–97)
MCV RBC AUTO: 94.9 FL (ref 74–97)
MCV RBC AUTO: 96.2 FL (ref 74–97)
MONOCYTES # BLD: 0.7 K/UL (ref 0.05–1.2)
MONOCYTES # BLD: 0.8 K/UL (ref 0.05–1.2)
MONOCYTES # BLD: 0.8 K/UL (ref 0.05–1.2)
MONOCYTES # BLD: 0.9 K/UL (ref 0.05–1.2)
MONOCYTES NFR BLD: 7 % (ref 3–10)
MONOCYTES NFR BLD: 7 % (ref 3–10)
MONOCYTES NFR BLD: 8 % (ref 3–10)
MONOCYTES NFR BLD: 8 % (ref 3–10)
NEUTS SEG # BLD: 10 K/UL (ref 1.8–8)
NEUTS SEG # BLD: 10.1 K/UL (ref 1.8–8)
NEUTS SEG # BLD: 7.1 K/UL (ref 1.8–8)
NEUTS SEG # BLD: 8.7 K/UL (ref 1.8–8)
NEUTS SEG NFR BLD: 81 % (ref 40–73)
NEUTS SEG NFR BLD: 82 % (ref 40–73)
NEUTS SEG NFR BLD: 82 % (ref 40–73)
NEUTS SEG NFR BLD: 83 % (ref 40–73)
O2/TOTAL GAS SETTING VFR VENT: 36 %
PCO2 BLD: 48.1 MMHG (ref 35–45)
PH BLD: 7.32 [PH] (ref 7.35–7.45)
PLATELET # BLD AUTO: 122 K/UL (ref 135–420)
PLATELET # BLD AUTO: 138 K/UL (ref 135–420)
PLATELET # BLD AUTO: 143 K/UL (ref 135–420)
PLATELET # BLD AUTO: 91 K/UL (ref 135–420)
PMV BLD AUTO: 10.7 FL (ref 9.2–11.8)
PMV BLD AUTO: 10.8 FL (ref 9.2–11.8)
PMV BLD AUTO: 11 FL (ref 9.2–11.8)
PMV BLD AUTO: 11 FL (ref 9.2–11.8)
PO2 BLD: 59 MMHG (ref 80–100)
POTASSIUM SERPL-SCNC: 4.1 MMOL/L (ref 3.5–5.5)
PROT SERPL-MCNC: 6 G/DL (ref 6.4–8.2)
PROTHROMBIN TIME: 16 SEC (ref 11.5–15.2)
Q-T INTERVAL, ECG07: 470 MS
QRS DURATION, ECG06: 90 MS
QTC CALCULATION (BEZET), ECG08: 629 MS
RBC # BLD AUTO: 2.6 M/UL (ref 4.2–5.3)
RBC # BLD AUTO: 3.55 M/UL (ref 4.2–5.3)
RBC # BLD AUTO: 3.72 M/UL (ref 4.2–5.3)
RBC # BLD AUTO: 3.78 M/UL (ref 4.2–5.3)
SAO2 % BLD: 88 % (ref 92–97)
SERVICE CMNT-IMP: ABNORMAL
SODIUM SERPL-SCNC: 141 MMOL/L (ref 136–145)
SPECIMEN TYPE: ABNORMAL
TOTAL RESP. RATE, ITRR: 22
TROPONIN I SERPL-MCNC: 0.02 NG/ML (ref 0–0.04)
VENTRICULAR RATE, ECG03: 108 BPM
WBC # BLD AUTO: 10.7 K/UL (ref 4.6–13.2)
WBC # BLD AUTO: 12.3 K/UL (ref 4.6–13.2)
WBC # BLD AUTO: 12.3 K/UL (ref 4.6–13.2)
WBC # BLD AUTO: 8.7 K/UL (ref 4.6–13.2)

## 2021-01-27 PROCEDURE — C9113 INJ PANTOPRAZOLE SODIUM, VIA: HCPCS | Performed by: FAMILY MEDICINE

## 2021-01-27 PROCEDURE — 36600 WITHDRAWAL OF ARTERIAL BLOOD: CPT

## 2021-01-27 PROCEDURE — 74011250636 HC RX REV CODE- 250/636: Performed by: FAMILY MEDICINE

## 2021-01-27 PROCEDURE — 74011000250 HC RX REV CODE- 250: Performed by: FAMILY MEDICINE

## 2021-01-27 PROCEDURE — 85610 PROTHROMBIN TIME: CPT

## 2021-01-27 PROCEDURE — 74011250636 HC RX REV CODE- 250/636

## 2021-01-27 PROCEDURE — 74011250636 HC RX REV CODE- 250/636: Performed by: PHYSICIAN ASSISTANT

## 2021-01-27 PROCEDURE — 36415 COLL VENOUS BLD VENIPUNCTURE: CPT

## 2021-01-27 PROCEDURE — 77030040361 HC SLV COMPR DVT MDII -B: Performed by: INTERNAL MEDICINE

## 2021-01-27 PROCEDURE — 82962 GLUCOSE BLOOD TEST: CPT

## 2021-01-27 PROCEDURE — 76040000008: Performed by: INTERNAL MEDICINE

## 2021-01-27 PROCEDURE — P9047 ALBUMIN (HUMAN), 25%, 50ML: HCPCS | Performed by: FAMILY MEDICINE

## 2021-01-27 PROCEDURE — 93005 ELECTROCARDIOGRAM TRACING: CPT

## 2021-01-27 PROCEDURE — 82803 BLOOD GASES ANY COMBINATION: CPT

## 2021-01-27 PROCEDURE — 85025 COMPLETE CBC W/AUTO DIFF WBC: CPT

## 2021-01-27 PROCEDURE — 97161 PT EVAL LOW COMPLEX 20 MIN: CPT

## 2021-01-27 PROCEDURE — 86677 HELICOBACTER PYLORI ANTIBODY: CPT

## 2021-01-27 PROCEDURE — 77030039961 HC KT CUST COLON BSC -D: Performed by: INTERNAL MEDICINE

## 2021-01-27 PROCEDURE — 97116 GAIT TRAINING THERAPY: CPT

## 2021-01-27 PROCEDURE — 0W3P8ZZ CONTROL BLEEDING IN GASTROINTESTINAL TRACT, VIA NATURAL OR ARTIFICIAL OPENING ENDOSCOPIC: ICD-10-PCS | Performed by: INTERNAL MEDICINE

## 2021-01-27 PROCEDURE — 74011250636 HC RX REV CODE- 250/636: Performed by: ORTHOPAEDIC SURGERY

## 2021-01-27 PROCEDURE — 2709999900 HC NON-CHARGEABLE SUPPLY: Performed by: INTERNAL MEDICINE

## 2021-01-27 PROCEDURE — 74011250636 HC RX REV CODE- 250/636: Performed by: INTERNAL MEDICINE

## 2021-01-27 PROCEDURE — 82553 CREATINE MB FRACTION: CPT

## 2021-01-27 PROCEDURE — 74011250637 HC RX REV CODE- 250/637: Performed by: PHYSICIAN ASSISTANT

## 2021-01-27 PROCEDURE — 77010033678 HC OXYGEN DAILY

## 2021-01-27 PROCEDURE — 80053 COMPREHEN METABOLIC PANEL: CPT

## 2021-01-27 PROCEDURE — 65660000000 HC RM CCU STEPDOWN

## 2021-01-27 PROCEDURE — 74011636637 HC RX REV CODE- 636/637: Performed by: FAMILY MEDICINE

## 2021-01-27 PROCEDURE — 82378 CARCINOEMBRYONIC ANTIGEN: CPT

## 2021-01-27 RX ORDER — SODIUM CHLORIDE 0.9 % (FLUSH) 0.9 %
5-40 SYRINGE (ML) INJECTION EVERY 8 HOURS
Status: CANCELLED | OUTPATIENT
Start: 2021-01-27

## 2021-01-27 RX ORDER — NALOXONE HYDROCHLORIDE 0.4 MG/ML
INJECTION, SOLUTION INTRAMUSCULAR; INTRAVENOUS; SUBCUTANEOUS
Status: COMPLETED
Start: 2021-01-27 | End: 2021-01-27

## 2021-01-27 RX ORDER — INSULIN LISPRO 100 [IU]/ML
INJECTION, SOLUTION INTRAVENOUS; SUBCUTANEOUS
Status: DISCONTINUED | OUTPATIENT
Start: 2021-01-27 | End: 2021-02-01 | Stop reason: HOSPADM

## 2021-01-27 RX ORDER — DEXTROMETHORPHAN/PSEUDOEPHED 2.5-7.5/.8
1.2 DROPS ORAL
Status: CANCELLED | OUTPATIENT
Start: 2021-01-27

## 2021-01-27 RX ORDER — FENTANYL CITRATE 50 UG/ML
INJECTION, SOLUTION INTRAMUSCULAR; INTRAVENOUS AS NEEDED
Status: DISCONTINUED | OUTPATIENT
Start: 2021-01-27 | End: 2021-02-01 | Stop reason: HOSPADM

## 2021-01-27 RX ORDER — DIPHENHYDRAMINE HYDROCHLORIDE 50 MG/ML
50 INJECTION, SOLUTION INTRAMUSCULAR; INTRAVENOUS ONCE
Status: CANCELLED | OUTPATIENT
Start: 2021-01-27 | End: 2021-01-27

## 2021-01-27 RX ORDER — ALBUMIN HUMAN 250 G/1000ML
25 SOLUTION INTRAVENOUS EVERY 6 HOURS
Status: DISCONTINUED | OUTPATIENT
Start: 2021-01-27 | End: 2021-01-31

## 2021-01-27 RX ORDER — SODIUM CHLORIDE 0.9 % (FLUSH) 0.9 %
5-40 SYRINGE (ML) INJECTION AS NEEDED
Status: CANCELLED | OUTPATIENT
Start: 2021-01-27

## 2021-01-27 RX ORDER — MIDAZOLAM HYDROCHLORIDE 1 MG/ML
.25-5 INJECTION, SOLUTION INTRAMUSCULAR; INTRAVENOUS
Status: CANCELLED | OUTPATIENT
Start: 2021-01-27 | End: 2021-01-27

## 2021-01-27 RX ORDER — NALOXONE HYDROCHLORIDE 0.4 MG/ML
0.4 INJECTION, SOLUTION INTRAMUSCULAR; INTRAVENOUS; SUBCUTANEOUS
Status: CANCELLED | OUTPATIENT
Start: 2021-01-27 | End: 2021-01-27

## 2021-01-27 RX ORDER — EPINEPHRINE 0.1 MG/ML
1 INJECTION INTRACARDIAC; INTRAVENOUS
Status: CANCELLED | OUTPATIENT
Start: 2021-01-27 | End: 2021-01-28

## 2021-01-27 RX ORDER — DEXTROSE MONOHYDRATE 100 MG/ML
125-250 INJECTION, SOLUTION INTRAVENOUS AS NEEDED
Status: DISCONTINUED | OUTPATIENT
Start: 2021-01-27 | End: 2021-02-01 | Stop reason: HOSPADM

## 2021-01-27 RX ORDER — SODIUM CHLORIDE 9 MG/ML
250 INJECTION, SOLUTION INTRAVENOUS AS NEEDED
Status: DISCONTINUED | OUTPATIENT
Start: 2021-01-27 | End: 2021-02-01 | Stop reason: HOSPADM

## 2021-01-27 RX ORDER — FLUMAZENIL 0.1 MG/ML
0.2 INJECTION INTRAVENOUS
Status: CANCELLED | OUTPATIENT
Start: 2021-01-27 | End: 2021-01-27

## 2021-01-27 RX ORDER — SODIUM CHLORIDE 9 MG/ML
INJECTION, SOLUTION INTRAVENOUS
Status: DISCONTINUED | OUTPATIENT
Start: 2021-01-27 | End: 2021-02-01 | Stop reason: HOSPADM

## 2021-01-27 RX ORDER — MIDAZOLAM HYDROCHLORIDE 5 MG/ML
INJECTION, SOLUTION INTRAMUSCULAR; INTRAVENOUS AS NEEDED
Status: DISCONTINUED | OUTPATIENT
Start: 2021-01-27 | End: 2021-02-01 | Stop reason: HOSPADM

## 2021-01-27 RX ORDER — SODIUM CHLORIDE 9 MG/ML
1000 INJECTION, SOLUTION INTRAVENOUS CONTINUOUS
Status: CANCELLED | OUTPATIENT
Start: 2021-01-27 | End: 2021-01-27

## 2021-01-27 RX ORDER — SODIUM CHLORIDE 9 MG/ML
1000 INJECTION, SOLUTION INTRAVENOUS CONTINUOUS
Status: CANCELLED | OUTPATIENT
Start: 2021-01-27

## 2021-01-27 RX ORDER — MAGNESIUM SULFATE 100 %
4 CRYSTALS MISCELLANEOUS AS NEEDED
Status: DISCONTINUED | OUTPATIENT
Start: 2021-01-27 | End: 2021-02-01 | Stop reason: HOSPADM

## 2021-01-27 RX ORDER — FENTANYL CITRATE 50 UG/ML
100 INJECTION, SOLUTION INTRAMUSCULAR; INTRAVENOUS
Status: CANCELLED | OUTPATIENT
Start: 2021-01-27 | End: 2021-01-27

## 2021-01-27 RX ORDER — ATROPINE SULFATE 0.1 MG/ML
0.5 INJECTION INTRAVENOUS
Status: CANCELLED | OUTPATIENT
Start: 2021-01-27 | End: 2021-01-28

## 2021-01-27 RX ORDER — HEPARIN SODIUM (PORCINE) LOCK FLUSH IV SOLN 100 UNIT/ML 100 UNIT/ML
10 SOLUTION INTRAVENOUS
Status: COMPLETED | OUTPATIENT
Start: 2021-01-27 | End: 2021-01-27

## 2021-01-27 RX ADMIN — HEPARIN SODIUM (PORCINE) LOCK FLUSH IV SOLN 100 UNIT/ML 10 UNITS: 100 SOLUTION at 13:45

## 2021-01-27 RX ADMIN — Medication 10 ML: at 14:00

## 2021-01-27 RX ADMIN — PANTOPRAZOLE SODIUM 40 MG: 40 INJECTION, POWDER, FOR SOLUTION INTRAVENOUS at 14:59

## 2021-01-27 RX ADMIN — ALBUMIN (HUMAN) 25 G: 0.25 INJECTION, SOLUTION INTRAVENOUS at 23:01

## 2021-01-27 RX ADMIN — SPIRONOLACTONE 12.5 MG: 25 TABLET ORAL at 13:00

## 2021-01-27 RX ADMIN — PANTOPRAZOLE SODIUM 40 MG: 40 INJECTION, POWDER, FOR SOLUTION INTRAVENOUS at 23:00

## 2021-01-27 RX ADMIN — Medication 10 ML: at 06:00

## 2021-01-27 RX ADMIN — NALOXONE HYDROCHLORIDE 0.4 MG: 0.4 INJECTION, SOLUTION INTRAMUSCULAR; INTRAVENOUS; SUBCUTANEOUS at 09:39

## 2021-01-27 RX ADMIN — INSULIN LISPRO 2 UNITS: 100 INJECTION, SOLUTION INTRAVENOUS; SUBCUTANEOUS at 22:59

## 2021-01-27 RX ADMIN — SODIUM CHLORIDE 500 ML: 900 INJECTION, SOLUTION INTRAVENOUS at 11:55

## 2021-01-27 RX ADMIN — ALBUMIN (HUMAN) 25 G: 0.25 INJECTION, SOLUTION INTRAVENOUS at 11:56

## 2021-01-27 RX ADMIN — HYDROMORPHONE HYDROCHLORIDE 0.5 MG: 1 INJECTION, SOLUTION INTRAMUSCULAR; INTRAVENOUS; SUBCUTANEOUS at 06:33

## 2021-01-27 RX ADMIN — ALBUMIN (HUMAN) 25 G: 0.25 INJECTION, SOLUTION INTRAVENOUS at 01:15

## 2021-01-27 RX ADMIN — HYDROMORPHONE HYDROCHLORIDE 0.5 MG: 1 INJECTION, SOLUTION INTRAMUSCULAR; INTRAVENOUS; SUBCUTANEOUS at 03:00

## 2021-01-27 RX ADMIN — ALBUMIN (HUMAN) 25 G: 0.25 INJECTION, SOLUTION INTRAVENOUS at 05:00

## 2021-01-27 RX ADMIN — ALPRAZOLAM 0.5 MG: 0.5 TABLET ORAL at 23:00

## 2021-01-27 RX ADMIN — ENOXAPARIN SODIUM 30 MG: 30 INJECTION SUBCUTANEOUS at 05:45

## 2021-01-27 RX ADMIN — MONTELUKAST 10 MG: 10 TABLET, FILM COATED ORAL at 14:58

## 2021-01-27 NOTE — PROGRESS NOTES
Respiratory attended rapid response for altered mental status. ABG obtained on 4lpm nc. Pao2 59 placed patient on NRB.

## 2021-01-27 NOTE — PROGRESS NOTES
RRT NOTE/Daily Progress Note:     Called because pt is less aware than baseline. Harder to awaken, very sleepy and groggy. Hypoxia also noted. 02 sats 80s. O: Gen: sleepy, groggy, axox3, follows commands well        CV: a fib, mild tachy        Resp: CTAB       GI: copious amount of marroon stool     Acute respiratory failure with hypoxemia   Over-sedation with narcotic pain meds   Active GI Bleed     ABG P02 50s  EKG: Afib,   CBC pending     Narcan given     Pt placed on NRB   Slow down narcotic administration   Type and Screen for blood transfusion   Will likely need to order transfusion, waiting for H/H   Place GIB orders   Medicine team previously consulted for GIB and hypotension overnight and nocturnist consulted GI   Coumadin was stopped but Lovenox continued for DVT prophylaxis in high risk patient, I held it now. Will add SCDs  Continue IV Protonix and trend CBC  Will add IVF bolus, exercising caution because h/o CHF  Will update Dr. Betzy Coffman that pt is actively bleeding   Transfer to telemetry  Order CT angiogram of abdomen/pelvis    Update: could not get ct angiogram because allergic to contrast dye, attempted bleeding scan but pt could not tolerate laying flat. Updated Dr. Betzy Coffman multiple times and he has decided to take pt for EGD and then possible colonoscopy tomorrow. Decrepancy with H/H level, repeating CBC. No recurrent bleeding episodes per bedside nurse. Will transfer pt to my list from Dr. Dustin Lloyd since he has not yet seen her and she was new to him. 70 y/o female with chronic atrial fibrillation, CHF, HTN, and morbid obesity status post right knee total arthroplasty yesterday. Consulted overnight for gastrointestinal bleeding and hypotension.

## 2021-01-27 NOTE — PROGRESS NOTES
Hospitalist Progress Note    Patient: Kailey Sarah MRN: [id number]  CSN: [id number]    YOB: 1950  Age: 69 y.o. Sex: female    DOA: 1/25/2021 LOS:  LOS: 0 days                Assessment/Plan     Patient Active Problem List   Diagnosis Code    Osteoarthritis of right knee M17.11    Atrial fibrillation (HCC) I48.91    GI bleed K92.2    Hypotension I95.9            68 y/o female with chronic atrial fibrillation, CHF, HTN, and morbid obesity status post right knee total arthroplasty, medicine consulted for gastrointestinal bleeding and hypotension. She does not want colonoscopy.     Hypotension -  IV fluid bolus given x1 due to CHF  Hold coreg and diltiazem, add back carefully to balance the risk of precipitating RVR with hypotension.     GI bleed-  S/p EGD with findings of There are multiple prepyloric linear long erosions and ulcerations with congested erythematous mucosa. Confluent superficial hemorrhagic ulcerations in the distal bulb covered with flat clots and extending into the distal duodenum up to D3. In the distal bulb where there is a large visible vessel with sentinel clot not actively bleeding or oozing. One clip is applied. There is a mass effect compressing the proximal part of the D2 suggestive of pancreatic neoplasm? ? But without visible mucosal invasion. CT abdomen and pelvis with no pancreatic mass. Left staghorn calculus    Follow H/H     Atrial fibrillation -  hold coumadin for GI bleeding  hold coreg and diltiazem due to hypotension and restart carefully depending on BP    AMY -  CPAP at night. BMI of 46.4     Osteoarthritis of R knee  S/p right TKA    DVT prophylaxis with SCD.        Review of systems  General: No fevers or chills. Cardiovascular: No chest pain or pressure. No palpitations. Pulmonary: No shortness of breath. Gastrointestinal: No nausea, vomiting. Physical Exam:  General: Awake, cooperative, no acute distress    HEENT: NC, Atraumatic. PERRLA, anicteric sclerae. Lungs: CTA Bilaterally. Upper airway rhonchi. Heart:  S1 S2,  No murmur, No Rubs, No Gallops  Abdomen: Soft, Non distended, Non tender.  +Bowel sounds,   Extremities: Right knee with dressing  Psych:   Not anxious or agitated. Neurologic:  No acute neurological deficit. Vital signs/Intake and Output:  Visit Vitals  /77   Pulse 100   Temp (!) 100.8 °F (38.2 °C)   Resp 14   Ht 5' 8.5\" (1.74 m)   Wt 140.6 kg (310 lb)   SpO2 93%   BMI 46.45 kg/m²     Current Shift:  No intake/output data recorded. Last three shifts:  01/25 1901 - 01/27 0700  In: 2980 [P.O.:230; I.V.:2750]  Out: 400 [Urine:400]            Labs: Results:       Chemistry Recent Labs     01/26/21 0153   *      K 4.4      CO2 29   BUN 20*   CREA 0.87   CA 8.4*   AGAP 5   BUCR 23*      CBC w/Diff Recent Labs     01/27/21  0415 01/26/21 2020 01/25/21 2045   WBC 12.3  --   --    RBC 3.78*  --   --    HGB 11.2* 12.0 12.8   HCT 35.5 37.1 40.2     --   --    GRANS 83*  --   --    LYMPH 10*  --   --    EOS 0  --   --       Cardiac Enzymes No results for input(s): CPK, CKND1, MAUREEN in the last 72 hours. No lab exists for component: CKRMB, TROIP   Coagulation Recent Labs     01/27/21 0415 01/26/21 0153 01/25/21  1200   PTP 16.0* 15.2 14.0   INR 1.3* 1.2 1.1   APTT  --   --  27.8       Lipid Panel No results found for: CHOL, CHOLPOCT, CHOLX, CHLST, CHOLV, 056225, HDL, HDLP, LDL, LDLC, DLDLP, 822001, VLDLC, VLDL, TGLX, TRIGL, TRIGP, TGLPOCT, CHHD, CHHDX   BNP No results for input(s): BNPP in the last 72 hours. Liver Enzymes No results for input(s): TP, ALB, TBIL, AP in the last 72 hours.     No lab exists for component: SGOT, GPT, DBIL   Thyroid Studies No results found for: T4, T3U, TSH, TSHEXT     Procedures/imaging: see electronic medical records for all procedures/Xrays and details which were not copied into this note but were reviewed prior to creation of Plan

## 2021-01-27 NOTE — PROGRESS NOTES
Problem: Mobility Impaired (Adult and Pediatric)  Goal: *Acute Goals and Plan of Care (Insert Text)  Description: In 1-5 days pt will be able to perform:  ST.  Bed mobility:  Rolling L to R to L modified independent for positioning. 2.  Supine to sit to supine CGA with HR for meals. 3.  Sit to stand to sit CGA with RW in prep for ambulation. LT.  Gait:  Ambulate >50ft CGA with RW, WBAT, for home/community mobility. 2.  Stair Negotiation:  Ascend/descend >1 step CGA with HR for home entry. 3.  Activity tolerance: Tolerate up in chair 1-2 hours for ADL's.  4.  Patient/Family Education:  Patient/family to be independent with HEP for follow-up care and safe discharge. Note: Physical Therapy Attempt    Chart reviewed. Held physical therapy session due to:    []  Eating meal  []  Nausea  []  Dizziness  []  Lethargy  []  Lab Results  []  Blood Transfusion  []  Dialysis  []  Pain  [x]  Other:  RRT    Will attempt later today as pt schedule allows and appropriate.     Mayra Coto PT

## 2021-01-27 NOTE — PERIOP NOTES
Procedure complete, patient received a total of 100mL of normal saline, 4mg of versed and 75mcg of fentanyl during the procedure. Report called to [address], patient to have a clear liquid diet, orders updated by MD, patient stable on nasal cannula, reports back pain that was present on arrival to unit denies any pain related to procedure. Patient turned and cricket area cleaned, new chux placed under patient. Patient is alert and oriented, transported back to room 332.

## 2021-01-27 NOTE — PROCEDURES
(EGD) Esophagogastroduodenoscopy (UPPER ENDOSCOPY) Procedure Note  [facility] [place]  __________________________________________________________________________________________________________________________      1/27/2021     Patient: Dimple Okeefe YOB: 1950 Gender: female Age: 69 y.o. INDICATION:  Day 2 post right knee replacement on Lovenox started having last night multiple red burgundy liquid stools. Her hgb dropped frpm 11.2 to 7.7 in few hours but repeat 5 hours after it increased to 10.4 at 15:35. No dyspepsia or hx of NSAID's. She never had hx of PUD and never had a colonoscopy. PMH: Morbid obesity 310 lbs, COPD, sleep apnea, DM chronic atrial fibrillation, CHF. Hx of endometrial cancer 10 years ago     : Cecile Samuels MD    Referring Provider:  Gallo Fisher MD    Sedation:  Versed 4 mg IV, Fentanyl 75 mcg IV    Procedure Details:  After infomed consent was obtained for the procedure, with all risks and benefits of procedure explained to the patient. She was taken to the endoscopy suite and placed in supine position. Following sequential administration of sedation as per above, the endoscope was inserted into the mouth and advanced under direct vision to third portion of the duodenum. A careful inspection was made as the gastroscope was withdrawn, including a retroflexed view of the proximal stomach; findings and interventions are described below. OROPHARYNX: The vocal Cords and the larynx are normal.   ESOPHAGUS: The proximal, mid, and distal oesophagus are normal. The Z-Line is slightly irregular in one location. No hiatal hernia. STOMACH: Large stomach. No blood or food retention. There is a submucosal < 2 cm lipoma on the greater curvature side. There are multiple prepyloric linear long erosions and ulcerations with congested erythematous mucosa.  The fundus on antegrade and retroflex views is normal. The cardia, body, lesser curvature, greater curvature and pylorus are normal.   DUODENUM:  Confluent superficial hemorrhagic ulcerations in the distal bulb covered with flat clots and extending into the distal duodenum up to D3. It is more severe in the distal bulb where there is a large visible vessel with sentinel clot not actively bleeding or oozing. One clip is applied on this lesion to prevent it from bleeding. There is a mass effect compressing the proximal part of the D2 suggestive of pancreatic neoplasm? But without visible mucosal invasion. Therapies:  One metallic endo clip applied to visible  Vessel in the distal bulb    Specimen: none           Complications:   None    EBL:  Nil. IMPRESSION: Large stomach. No blood or food retention. There is a submucosal < 2 cm lipoma on the greater curvature side. There are multiple prepyloric linear long erosions and ulcerations with congested erythematous mucosa. Confluent superficial hemorrhagic ulcerations in the distal bulb covered with flat clots and extending into the distal duodenum up to D3. It is more severe in the distal bulb where there is a large visible vessel with sentinel clot not actively bleeding or oozing. One clip is applied on this lesion to prevent it from bleeding. There is a mass effect compressing the proximal part of the D2 suggestive of pancreatic neoplasm? ? But without visible mucosal invasion. RECOMMENDATION:  may resume  Clear liquid diet. Avoid NSAID's. Check H Pylori. Do CT scan of the abdomen without IV contrast, CEA. Start taking Pantoprazole 40 mg IV bid. Hold on the Lovenox until tomorrow am then resume with caution. Would need a repeat the EGD within a week or 2 to rexamine the area if the CT scan does not give us the answer.     Assistant: None    --Patricia Marie MD on 1/27/2021 at 4:51 PM

## 2021-01-27 NOTE — DIABETES MGMT
GLYCEMIC CONTROL PROGRESS NOTE:    - chart reviewed, known h/o T2DM, HbA1C within recommended range for age + comorbids on GLP 1 RA  - BG in target range non-ICU: < 180 mg/dL  - Humalog Normal Insulin Sensitivity Corrective Coverage ordered per protocol    Recent Glucose Results:   Lab Results   Component Value Date/Time    GLUCPOC 143 (H) 01/27/2021 09:36 AM     Jessica Moon MS, RN, CDE  Glycemic Control Team  [phone]  Pager [phone] (M-TH 8:00-4:30P)  *After Hours pager [phone]

## 2021-01-27 NOTE — PROGRESS NOTES
Blood drawn and tagged for GI bleed scan but pt unable to tolerate table or position for scan. Exam cancelled and pt returned to floor.

## 2021-01-27 NOTE — PROGRESS NOTES
0730 - Bedside shift report received from [facility]. Assumed care of patient. Patient noted resting in bed with eyes closed at this time. Respirations even and unlabored. Call light in reach. 8506- Patient noted only responsive to physical stimuli and dusky in color. Patient responding verbally but confused and will not open eyes when asked. O2 sats 81% on 2L via nasal cannula. Patient noted wheezing. 9000 - RRT called. [address] arrived. Supervisor present. Nicki Hernandez RN present. Dr. Sudha Levin present. Orders received to give Narcan. Stat CBC, ABG's, and EKG ordered. Patient O2 increased to 6L via nasal cannula. 9908 - Narcan administered. 1590 - Patient more alert and oriented to person, place, and situation. Receiving O2 via non-rebreather mask at 15 L/min. Noted moaning related to abdomen and right knee pain. Patient resting in bed with call light in reach. 1000 - Medics attempting to start a new IV at this time. 1025 - New 22 gauge IV started to right FA by JOHN Solis RRTM. 1045 - Incontinence care provided. Patient voided and bloody stool noted. 1255 - Patient transferred to room 332.

## 2021-01-27 NOTE — ROUTINE PROCESS
TRANSFER - OUT REPORT: 
 
Verbal report given to KATHERINE Chávez (name) on Yoshi Ramirez  being transferred to Telemetry (unit) for change in patient condition(hypoxia, active GI bleed) Report consisted of patients Situation, Background, Assessment and  
Recommendations(SBAR). Information from the following report(s) SBAR, Kardex, OR Summary, Intake/Output, MAR and Recent Results was reviewed with the receiving nurse. Lines:  
Peripheral IV 01/25/21 Posterior;Right Hand (Active) Site Assessment Clean, dry, & intact 01/27/21 0100 Phlebitis Assessment 0 01/27/21 0100 Infiltration Assessment 0 01/27/21 0100 Dressing Status Clean, dry, & intact 01/27/21 0100 Dressing Type Tape;Transparent 01/27/21 0100 Hub Color/Line Status Green; Infusing 01/27/21 0100 Action Taken Open ports on tubing capped 01/27/21 0100 Alcohol Cap Used Yes 01/27/21 0100 Peripheral IV 01/27/21 Right Forearm (Active) Opportunity for questions and clarification was provided.

## 2021-01-27 NOTE — PROGRESS NOTES
Problem: Falls - Risk of  Goal: *Absence of Falls  Description: Document Cristina Espitia Fall Risk and appropriate interventions in the flowsheet.   Outcome: Progressing Towards Goal  Note: Fall Risk Interventions:  Mobility Interventions: Patient to call before getting OOB         Medication Interventions: Patient to call before getting OOB, Teach patient to arise slowly    Elimination Interventions: Call light in reach    History of Falls Interventions: Door open when patient unattended         Problem: Knee Replacement: Day of Surgery/Unit  Goal: Activity/Safety  Outcome: Progressing Towards Goal     Problem: Pain  Goal: *Control of Pain  Outcome: Progressing Towards Goal

## 2021-01-27 NOTE — PROGRESS NOTES
Bedside and Verbal shift change report given to RENE Barboza (oncoming nurse) KATHERINE Ritter (offgoing nurse). Report included the following information SBAR, Kardex, ED Summary, Intake/Output, MAR and Med Rec Status. Patient in bed alert and oriented X 4 . Room air Patient has no pain rates pain 8/10 on the numeric scale. Bed is at the lowest position , phone personal items and call light are within reach    2111 patient denies shortness of breath, IS up to 2000 chest pain. Lungs are clear. Cap refill less than 3 sec's. Dressing to 18G PIV at the right hand is clean dry and intact. Dressing to the right knee has scant blood on the dressing, its dry and intact. +2 edema, patient states she has no numbness and  tingling or calf pain Dorsiflexion and plantarflexion are weak and equal bilateral.hand  strong and equal bilateral . Patient had a bloody stools Bed is at the lowest position , phone personal items and call light are within reach    0100am reassessment completed. No changes noted. Bed is at the lowest position , phone personal items and call light are within reach    [id number] changed patient linen , gown and cleaned her up. Bed is at the lowest position , phone personal items and call light are within reach    Bedside and Verbal shift change report given to ANGELICA Shafer (oncoming nurse) by RENE Quinonez (offgoing nurse). Report included the following information SBAR, Kardex, OR Summary, Intake/Output, MAR, Recent Results and Med Rec Status.

## 2021-01-27 NOTE — ROUTINE PROCESS
1300: Report received from [address] [facility]  
8883: Patient back on unit from [facility], patient couldn't tolerate.  aware 
 
1500: Assessed patient, no maroon stools at this time. Patient appears to be more alert taking sips of water with this nurse at bedside. Linen changed purewick applied due to patients immobility. Patient has some c/o pain. Spoke with Dr. Boy Brown regarding this issue already. Blood bank called blood ready and nurse will start asap.

## 2021-01-27 NOTE — CONSULTS
Medicine Consult    Patient:  Federcio Lanza 69 y.o. female  Asked to evaluate patient by Bhanu Jeffery PA-C  Primary Care Provider:  Dannielle Mendez MD  Date of Admission:  1/25/2021  Reason for Consult: GI bleeding        Assessment/Plan     Patient Active Problem List   Diagnosis Code    Osteoarthritis of right knee M17.11    Atrial fibrillation ([facility] [address] I48.91    GI bleed K92.2    Hypotension I95.9       PLAN:  70 y/o female with chronic atrial fibrillation, CHF, HTN, and morbid obesity status post right knee total arthroplasty yesterday is having gastrointestinal bleeding and hypotension. I suspect her hypotension is related to her pain medications in combination with coreg and diltiazem for her atrial fibrillation and hypertension. She does not appear to have any significant blood loss as H/H is unchanged after a reported 5-6 episodes of small amount of blood today. Hypotension  -IV fluid bolus given x1 due to CHF  -hold morning coreg and diltiazem, add back carefully to balance the risk of precipitating RVR with hypotension    GI bleed-stable H/H after 24 hours but occult stool positive  -clear liquid diet  -trend CBC  -I requested to see the bowel movement if she has another episode of bleeding  -protonix 80 mg x1, then 40 mg twice daily  -stop coumadin but continue prophylactic lovenox for DVT prophylaxis  -GI consult to consider inpatient colonoscopy    Atrial fibrillation  -hold coumadin for now due to GI bleeding  -hold coreg and diltiazem due to hypotension and restart carefully    Osteoarthritis of R knee-POD#1  -continue lovenox prophylaxis for now due to high risk of DVT and no large blood loss    Concepcion Hogan MD  [facility]    Thank you for allowing us to participate in this patient's care.   HPI:   CC: GI bleeding  Federico Lanza is a 69 y.o. female with chronic atrial fibrillation, CHF, HTN, and morbid obesity status post right knee total arthroplasty yesterday complains of 5-6 episodes of small amounts of dark red blood in her stool since her surgery. She tells me she was taken off coumadin and put on ASA for atrial fibrillation awhile ago. However, pharmacy confirms active refills of coumadin within the past month. She was placed on coumadin with a lovenox bridge postoperatively. She has received both lovenox and coumadin within the last 24 hours. She has never had a colonoscopy. She endorses a prior history of colitis but can't tell me any additional details about this or what treatment was given. She said she has had some diarrhea prior to surgery but it has never been bloody. No prior history of DVT/PE. She is in pain (knee) and it is difficult for her to provide much history or details. Past Medical History:   Diagnosis Date    Arrhythmia     a-fib    Arthritis     Cancer ([facility] [address]     endometrial     basal skin    Chronic obstructive pulmonary disease (HCC)     no inhaler    Diabetes ([facility] [address]     no meds at present 7-15-20    Heart failure ([facility] [address] 2014    CHF    Hypertension 2000    Morbid obesity ([facility] [address]     Psychiatric disorder     depression    Sleep apnea     instructed to bring morning of surgery    Thyroid disease      Past Surgical History:   Procedure Laterality Date    HX ADENOIDECTOMY      HX HYSTERECTOMY      HX OOPHORECTOMY      HX TONSILLECTOMY        Social History     Tobacco Use    Smoking status: Former Smoker     Quit date: 1974     Years since quittin.0    Smokeless tobacco: Never Used   Substance Use Topics    Alcohol use: Not Currently     Comment: 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 No pertinent family history. No current facility-administered medications on file prior to encounter. Current Outpatient Medications on File Prior to Encounter   Medication Sig Dispense Refill    acetaminophen (TYLENOL) 500 mg tablet Take 500 mg by mouth every six (6) hours as needed for Pain.       carvediloL (COREG) 25 mg tablet Take 25 mg by mouth two (2) times daily (with meals).  spironolactone (ALDACTONE) 25 mg tablet Take 12.5 mg by mouth daily (after lunch).  thyroid, Pork, ([place] Thyroid) 60 mg tablet Take 60 mg by mouth daily.  dilTIAZem ER (DILACOR XR) 120 mg capsule Take 120 mg by mouth daily.  Cetirizine 10 mg cap Take  by mouth nightly.  warfarin (Coumadin) 3 mg tablet Take 3 mg by mouth nightly.  ALPRAZolam (XANAX) 1 mg tablet Take 0.5 mg by mouth nightly.  HYDROcodone-acetaminophen (NORCO) 5-325 mg per tablet Take 1 Tab by mouth every eight (8) hours as needed for Pain.  potassium 99 mg tablet Take 188 mg by mouth daily.  dulaglutide (Trulicity) 8.70 PY/4.7 mL sub-q pen 0.75 mg by SubCUTAneous route every seven (7) days. Every Wednesday      cholecalciferol, vitamin D3, (Vitamin D3) 50 mcg (2,000 unit) tab Take  by mouth.         Allergies   Allergen Reactions    Iodinated Contrast Media Anaphylaxis     ** topical betadine makes patient itch **    Demerol [Meperidine] Shortness of Breath    Epinephrine Other (comments)     Heart issues    Gabapentin Other (comments)     Smell; \"just did not work for [facility]"    Penicillins Shortness of Breath    Percocet [Oxycodone-Acetaminophen] Nausea and Vomiting    Seafood Shortness of Breath and Nausea and Vomiting     ** topical betadine makes patient itch **           Review of Systems  Constitutional: No fever, chills, diaphoresis, malaise, fatigue or weight gain/loss or falls  Skin: no itching or rashes  HEENT: no ear discomfort, hearing loss, tinnitus, epistaxis or sore throat  EYES: no blurry vision, double vision or photophobia  CARDIOVASCULAR: no claudication, cp, palpitations, orthopnea, pnd or LE edema  PULMONARY: no cough, wheeze, shortness of breath or sputum production  GI: no nausea, vomiting, diarrhea, mild abdominal pain, +melena, no hematemesis or brbpr  MUSCULOSKELETAL: right knee pain  ENDOCRINE: no heat/cold intolerance, polyuria or polydipsia  HEME: GI bleeding      Physical Exam:      Visit Vitals  BP (!) 101/56 (BP 1 Location: Right arm, BP Patient Position: At rest)   Pulse 85   Temp 98 °F (36.7 °C)   Resp 14   Ht 5' 8.5\" (1.74 m)   Wt 140.6 kg (310 lb)   SpO2 98%   BMI 46.45 kg/m²     Body mass index is 46.45 kg/m².     Physical Exam:  GEN: well nourished, laying in bed in obvious pain  HEENT: atraumatic, nose normal,oropharynx clear, MMM  NECK: supple, trachea midline  EYES: conjuctiva normal, lids with out lesions, PERRL  HEART: RRR with out m/r/g, pmi nondisplaced, pulses 2+ distally  LUNGS: equal chest wall expansion, cta bl with out wheezes/rales or rhonchi  AB: soft, obese, +BS, nt/nd no organomegaly  NEURO: alert, awake and oriented x3, gait not assessed  SKIN: dry, intact, warm no breakdown noted  EXT: R knee markedly swollen postoperatively and painful, ice pack in place, no calf pain or Grecia's sign        Laboratory Studies:    Recent Results (from the past 24 hour(s))   METABOLIC PANEL, BASIC    Collection Time: 01/26/21  1:53 AM   Result Value Ref Range    Sodium 140 136 - 145 mmol/L    Potassium 4.4 3.5 - 5.5 mmol/L    Chloride 106 100 - 111 mmol/L    CO2 29 21 - 32 mmol/L    Anion gap 5 3.0 - 18 mmol/L    Glucose 204 (H) 74 - 99 mg/dL    BUN 20 (H) 7.0 - 18 MG/DL    Creatinine 0.87 0.6 - 1.3 MG/DL    BUN/Creatinine ratio 23 (H) 12 - 20      GFR est AA >60 >60 ml/min/1.73m2    GFR est non-AA >60 >60 ml/min/1.73m2    Calcium 8.4 (L) 8.5 - 10.1 MG/DL   PROTHROMBIN TIME + INR    Collection Time: 01/26/21  1:53 AM   Result Value Ref Range    Prothrombin time 15.2 11.5 - 15.2 sec    INR 1.2 0.8 - 1.2     OCCULT BLOOD, STOOL    Collection Time: 01/26/21  9:45 AM   Result Value Ref Range    Occult blood, stool Positive (A) NEG     HGB & HCT    Collection Time: 01/26/21  8:20 PM   Result Value Ref Range    HGB 12.0 12.0 - 16.0 g/dL    HCT 37.1 35.0 - 45.0 %

## 2021-01-27 NOTE — CONSULTS
[id number] [facility]    Name:  Omaira Palma  MR#:   [id number]  :  1950  ACCOUNT #:  [de-identified]  DATE OF SERVICE:  2021      HISTORY OF PRESENT ILLNESS:  This is a 65-year-old female who was admitted on Monday for right knee replacement for severe osteoarthritis. Yesterday, she had multiple bloody burgundy stools that continued until today, and then, her hemoglobin dropped to 7.7 at 11:25, but on repeat at 3:35, it was 10.4. The patient apparently has not received a blood transfusion yet. Her platelets dropped from 143 to 91 and now is 122. The patient used to be on Coumadin for 3 years for chronic atrial fibrillation, but this has been on hold for about a week before the surgery and was switched to Lovenox to prevent deep vein thrombosis. The patient has been receiving multiple doses of narcotics to control her right knee pain and had not been able to get up. This morning, she had an ILSA because of hypoxemia caused by narcotic overdose. She responded to the administration of Narcan and was put on oxygen. The patient claimed that yesterday she had been having multiple diarrhea, but she did not look to see what color it was, and today, she says she has not had a bowel movement. We tried to send her for a CT scan angio, but she was severely allergic to IV contrast with anaphylaxis. She was unstable to go for the GI bleeding scan. Presently, she is stable. Her blood pressure dropped to 86/61 around 11:46, and yesterday evening around 5:00 pm, it dropped to 86/57. Her heart rate has remained stable. PAST MEDICAL HISTORY:  Remarkable for  1. Morbid obesity. 2.  COPD. 3.  History of smoking long time ago. 4.  She has sleep apnea, on CPAP. 5.  She has hypertension, atrial fibrillation, on Coumadin for 3 years. 6.  Congestive heart failure. 7.  Diabetes mellitus for about 8 months. 8.  History of endometrial cancer, had hysterectomy about 10 years ago.   9.  She has a history of depression. 10.  Tonsillectomy. 11.  Oophorectomy. 12. She has had 3 pregnancies and deliveries. MEDICATIONS AT HOME:  1. Tylenol. 2.  Singulair 10 mg.  3.  Coreg 25 mg b.i.d.  4.  Aldactone 12.5 mg.  5.  Thyroid pork 60 mg tablet. 6.  Diltiazem .  7.  Cetirizine 10 mg.  8.  Coumadin 3 mg. 9.  Xanax 0.5 mg at night. 10.  Norco 5/325 every 8 hours as needed. 11.  Potassium 99 mg tablets 2 daily. 12.  Trulicity 0.7 KB/0.5 mL subcutaneous every 7 days. 13.  Vitamin D 2000 units. She denies taking any NSAIDs. She has no prior history of peptic ulcer disease. Denies having any dyspepsia, heartburns, nausea, vomiting, or dysphagia. She claims that she used to have IBS-C with rare diarrhea when stressed, but usually, she used to have regular bowel movement everyday. Denies ever having any rectal bleeding or melena. She never had any colonoscopy. ALLERGIES:  SHE IS ALLERGIC TO DEMEROL, IV CONTRAST, EPINEPHRINE, GABAPENTIN, PENICILLIN, PERCOCET, ACETAMINOPHEN, AND SEACOD. SOCIAL HISTORY:  She lives by herself, independent. Presently, she does not smoke or drink alcohol. Apparently she quit smoking in 1974. She used to be alcoholic but stopped 35 years ago. FAMILY HISTORY:  She has no family history of cancer. REVIEW OF SYSTEMS:  She denies having any chest pain. Presently, no shortness of breath. She is just complaining of knee pain despite the pump, it is 6/10. PHYSICAL EXAMINATION:  GENERAL:  The patient is alert and oriented, appears to be in mild distress from her knee pain, but she appears to understand and obey all commands. She responds well to questioning. VITAL SIGNS:  She denies having any fever or chills, but on her vital signs, her temperature went up to 100.8 at 9:28 a.m., and now, she is afebrile. Her heart rate is 122, on atrial fibrillation. Saturation 100%. Breathing 29. Blood pressure 132/79.   SKIN:  Normal.  She does not appear to be particularly pale as expected. She is not diaphoretic. HEENT:  The eyes are remarkable for pale conjunctivae. The sclerae are anicteric. The pupils are equal and reactive to light. Oropharyngeal cavity, she has very dry and slightly pale mucous membranes. She is edentulous. NECK:  Short, obese. LUNGS:  Clear to auscultation. HEART:  Cardiac rhythm is irregular, rapid, but no murmur. No obvious gallop. ABDOMEN:  Obese, protuberant, soft, nontender. No mass, no organomegaly. There is a small-to-medium-sized umbilical hernia. EXTREMITIES:  Remarkable for the presence of bandages on her right knee. She has compression stocking. She moves all her 4 extremities. LABORATORY DATA:  Blood Tests: We have a hemoglobin of 10.4 as of 3:35. Platelets 549. WBCs 10.7, 81% neutrophils. Basic metabolic panel this morning showed a BUN of 20, creatinine 0.87. They were 22 and 0.68 on 01/07, which is about the same. Normal LFTs on 01/07. We do not have any troponin. Her hemoglobin A1c was 6.4 on 01/07. Occult blood in the stools was positive yesterday. Her blood gas today at 9:44, PCO2 of 48.1, pH 7.32, PO2 of 59, and saturation 88%. Her INR at 4:16 was 1.3. We do not have PTC, but on 01/25 was 27.8. In conclusion, this is a 50-year-old female who has been on Coumadin and later on in the last week on Lovenox and postop for right knee replacement. She is day 2. Had yesterday and possibly early this morning some bleeding of red burgundy stools, most likely this is coming from her colon, probably it is diverticular, but we cannot do any imaging in her situation. We need in this case first to check her stomach to make sure that she does not have any bleeding ulcer, and if this is negative, then she can benefit from a colonoscopy on Friday.   We were initially concerned that her hemoglobin dropped from 11.2 to 7.7 within only few hours, but I think this was probably a lab error as the repeat at 3:35 was 10.4, a very mild decrease, but also the second one could be an error only. By repeating it another time, then we can confirm that. At any rate now, we are going to just check her stomach to make sure there is nothing there that is bleeding, and if this is normal, then we can consider doing a colonoscopy on Friday. Unfortunately, we have to hold her Lovenox for now and use other methods of avoidance of deep vein thrombosis as she is very high risk. We need to mobilize her as much as possible. I explained to the patient the procedure of EGD, the alternatives, and the risks involved. I told her that there is a risk of reaction to sedation. There is also a small risk of bleeding, perforation, and aspiration - all these are small, but not inexistent. She agreed to proceed. She still has to think about the colonoscopy which we may have to consider this coming Friday if she is stable. It seems to be that she stopped bleeding at this time. Her other health problems, she has morbid obesity, sleep apnea, COPD, diabetes mellitus well controlled, atrial fibrillation, anxiety, depression, and osteoarthritis. She has a history of endometrial cancer. Therefore, she is truly at high risk for colon cancer and should have at least screening colonoscopy, but now, we have to do diagnostic colonoscopy and lessening of rectal bleeding. Further notes will follow.       MD RAN Nicole/[email or web address]_[email or web address]/[email or web address]_P  D:  01/27/2021 16:49  T:  01/27/2021 18:31  JOB #:  [id number]  CC:  Vicky Weir MD

## 2021-01-27 NOTE — PROGRESS NOTES
Problem: Mobility Impaired (Adult and Pediatric)  Goal: *Acute Goals and Plan of Care (Insert Text)  Description: In 1-5 days pt will be able to perform:  ST.  Bed mobility:  Rolling L to R to L modified independent for positioning. 2.  Supine to sit to supine CGA with HR for meals. 3.  Sit to stand to sit CGA with RW in prep for ambulation. LT.  Gait:  Ambulate >50ft CGA with RW, WBAT, for home/community mobility. 2.  Stair Negotiation:  Ascend/descend >1 step CGA with HR for home entry. 3.  Activity tolerance: Tolerate up in chair 1-2 hours for ADL's.  4.  Patient/Family Education:  Patient/family to be independent with HEP for follow-up care and safe discharge. 2021 by Bev Hernandez PT  Note: []  Patient has met MD sandra kemp for d/c home   []  Recommend HH with 24 hour adult care   [x]  Benefit from additional acute PT session to address:  transfer training, gait training    PHYSICAL THERAPY TREATMENT    Patient: Ros Felder (88 y.o. female)  Date: 2021  Diagnosis: Osteoarthritis of right knee [M17.11] <principal problem not specified>  Procedure(s) (LRB):  ESOPHAGOGASTRODUODENOSCOPY (EGD); CLIP PLACEMENT (N/A) Day of Surgery  Precautions: Fall, WBAT      ASSESSMENT:  Pt rating pain in back and R knee 7/10 on numerical pain scale. Pt irritable upon arrival.  Pt seen for AAROM of R LE at ankle/knee/hip due to earlier RRT and continued work up. Assisted Nurse [address] w/ rolling to change bed linens requiring total Ax2. Pt needs encouragement for mobility. Pt left supine in bed w/ all needs within reach, ice pack to R knee. Nurse [address] aware. Recommend rehab upon hospital d/c.   Progression toward goals:   []      Improving appropriately and progressing toward goals  [x]      Improving slowly and progressing toward goals  []      Not making progress toward goals and plan of care will be adjusted     PLAN:  Patient continues to benefit from skilled intervention to address the above impairments. Continue treatment per established plan of care. Discharge Recommendations:  Rehab  Further Equipment Recommendations for Discharge:  N/A     SUBJECTIVE:   Patient stated You all put me here, I want to call my son and leave.     OBJECTIVE DATA SUMMARY:   Critical Behavior:  Neurologic State: Alert  Orientation Level: Oriented to person, Oriented to place, Oriented to situation  Cognition: Follows commands  Safety/Judgement: Decreased awareness of environment, Decreased awareness of need for assistance, Decreased awareness of need for safety, Decreased insight into deficits  Functional Mobility Training:  Bed Mobility:  Rolling: Total assistance;Assist x2; Additional time(vc)  Transfers:  Balance:   Range Of Motion:   Posture:   Wheelchair Mobility:   Ambulation/Gait Training:  Stairs:           Neuro Re-Education:    Therapeutic Exercises:         EXERCISE   Sets   Reps   Active Active Assist   Passive Self ROM   Comments   Ankle Pumps 1 10  [x] [] [] []    Quad Sets/Glut Sets 1 5  [x] [] [] [] Hold for 5 secs   Hamstring Sets   [] [] [] []    Short Arc Quads   [] [] [] []    Heel Slides 1 5 [x] [] [] []    Straight Leg Raises   [] [] [] []    Hip Add/abd supine 1 10 [x] [] [] [] Hold for 5 secs, w/ pillow squeeze   Long Arc Quads   [] [] [] []    Seated Marching   [] [] [] []    Standing Marching   [] [] [] []       [] [] [] []        Pain:  Pain level pre-treatment: 7/10  Pain level post-treatment: 7/10   Pain Intervention(s): Medication (see MAR); Rest, Ice, Repositioning   Response to intervention: Nurse notified, See doc flow    Activity Tolerance:   poor  Please refer to the flowsheet for vital signs taken during this treatment.   After treatment:   [] Patient left in no apparent distress sitting up in chair  [x] Patient left in no apparent distress in bed  [x] Call bell left within reach  [x] Nursing notified  [] Caregiver present  [] Bed alarm activated  [] SCDs applied      COMMUNICATION/EDUCATION:   [x]         Role of Physical Therapy in the acute care setting. []         Fall prevention education was provided and the patient/caregiver indicated understanding. [x]         Patient/family have participated as able in working toward goals and plan of care. [x]         Patient/family agree to work toward stated goals and plan of care. []         Patient understands intent and goals of therapy, but is neutral about his/her participation. []         Patient is unable to participate in stated goals/plan of care: ongoing with therapy staff.  []         Other:        Felix Hernandez, PT   Time Calculation: 26 mins   1/27/2021 1029 by Sandip Hampton PT  Note: Physical Therapy Attempt    Chart reviewed. Held physical therapy session due to:    []  Eating meal  []  Nausea  []  Dizziness  []  Lethargy  []  Lab Results  []  Blood Transfusion  []  Dialysis  []  Pain  [x]  Other:  RRT    Will attempt later today as pt schedule allows and appropriate.     Felix Hernandez PT

## 2021-01-27 NOTE — PROGRESS NOTES
Occupational Therapy Treatment Attempt    Chart reviewed. Attempted Occupational Therapy Treatment, however, patient unable to be seen due to:  []  Nausea/vomiting  []  Eating  []  Pain  []  Patient too lethargic  []  Off Unit for testing/procedure  []  Dialysis treatment in progress  []  Telemetry Results  [x]  Other: RRT d/t lethargy    Will f/u later as patient's schedule allows.    Thank you for this referral.  Kasandra Hancock, OTR/L, CSRS, CDCS, CFPS

## 2021-01-27 NOTE — ROUTINE PROCESS
RRT at 6293 for altered mental status. Provider, RNs present. IV access obtained and blood drawn/taken to lab. Pt to be transferred to tele. Medics remained with pt to monitor and transported to [facility].

## 2021-01-27 NOTE — PROGRESS NOTES
Chart reviewed cm unable to discuss discharge planning with pt RRT was called due to AMS, noted PT did recommend rehab, for continuity of care cm placed referral out to [facility] facilities due to location, unit cm will follow up with d/c planning.

## 2021-01-27 NOTE — PROGRESS NOTES
Progress Note     Patient: Camron Vergara MRN: [id number]  SSN: [id number]    YOB: 1950  Age: 69 y.o. Sex: female      POD:    2 Days Post-Op  S/P:    Procedure(s):  RIGHT TOTAL KNEE REPLACEMENT     Subjective:   Patient is very somnolent this morning and short with her answers as she is falling asleep. Pt is without complaints of pain in the knee; she denies having had PT yet due to her GI issues yesterday. She is having some continued stomach discomfort this morning. Objective:     Patient Vitals for the past 12 hrs:   BP Temp Pulse Resp SpO2   01/27/21 0300 (!) 116/58 98.1 °F (36.7 °C) 88 16 98 %   01/26/21 2331 97/61 98.6 °F (37 °C) 80 16 99 %   01/26/21 2233 (!) 101/56 98 °F (36.7 °C) 85 14 98 %   01/26/21 2118 98/60 98.5 °F (36.9 °C) 98 18 97 %   01/26/21 2111     90 %   01/26/21 1959 106/66 98.1 °F (36.7 °C) 89 18 90 %     Recent Labs     01/27/21  0415 01/26/21  0153 01/26/21  0153   HGB 11.2*   < >  --    HCT 35.5   < >  --    INR 1.3*  --  1.2   NA  --   --  140   K  --   --  4.4   CL  --   --  106   CO2  --   --  29   BUN  --   --  20*   CREA  --   --  0.87   GLU  --   --  204*    < > = values in this interval not displayed. Pt. resting in bed. Lower extremity operative dressing intact with anterior blood shadowing. Neurovascularly intact. Assessment:     S/P R TKR         Plan:   1. Continue pain management/ ice to operative area. 2. Continue to progress with PT/OT. 3. Discharge planning to home with home health once cleared by PT and medicine.   4. As per hospitalist recommendation, continue lovenox for DVT prophylaxis but hold warfarin

## 2021-01-28 ENCOUNTER — APPOINTMENT (OUTPATIENT)
Dept: CT IMAGING | Age: 71
DRG: 982 | End: 2021-01-28
Attending: INTERNAL MEDICINE
Payer: MEDICARE

## 2021-01-28 LAB
BASOPHILS # BLD: 0 K/UL (ref 0–0.1)
BASOPHILS NFR BLD: 0 % (ref 0–2)
CEA SERPL-MCNC: 1.5 NG/ML
DIFFERENTIAL METHOD BLD: ABNORMAL
EOSINOPHIL # BLD: 0 K/UL (ref 0–0.4)
EOSINOPHIL # BLD: 0.1 K/UL (ref 0–0.4)
EOSINOPHIL # BLD: 0.1 K/UL (ref 0–0.4)
EOSINOPHIL NFR BLD: 0 % (ref 0–5)
EOSINOPHIL NFR BLD: 1 % (ref 0–5)
EOSINOPHIL NFR BLD: 1 % (ref 0–5)
ERYTHROCYTE [DISTWIDTH] IN BLOOD BY AUTOMATED COUNT: 13.6 % (ref 11.6–14.5)
ERYTHROCYTE [DISTWIDTH] IN BLOOD BY AUTOMATED COUNT: 13.6 % (ref 11.6–14.5)
ERYTHROCYTE [DISTWIDTH] IN BLOOD BY AUTOMATED COUNT: 13.7 % (ref 11.6–14.5)
GLUCOSE BLD STRIP.AUTO-MCNC: 110 MG/DL (ref 70–110)
GLUCOSE BLD STRIP.AUTO-MCNC: 115 MG/DL (ref 70–110)
GLUCOSE BLD STRIP.AUTO-MCNC: 126 MG/DL (ref 70–110)
GLUCOSE BLD STRIP.AUTO-MCNC: 96 MG/DL (ref 70–110)
HCT VFR BLD AUTO: 31.6 % (ref 35–45)
HCT VFR BLD AUTO: 31.7 % (ref 35–45)
HCT VFR BLD AUTO: 31.9 % (ref 35–45)
HCT VFR BLD AUTO: 32.5 % (ref 35–45)
HCT VFR BLD AUTO: 32.9 % (ref 35–45)
HGB BLD-MCNC: 10 G/DL (ref 12–16)
HGB BLD-MCNC: 10.2 G/DL (ref 12–16)
HGB BLD-MCNC: 10.5 G/DL (ref 12–16)
HGB BLD-MCNC: 10.5 G/DL (ref 12–16)
HGB BLD-MCNC: 9.8 G/DL (ref 12–16)
INR PPP: 1.2 (ref 0.8–1.2)
LYMPHOCYTES # BLD: 1.3 K/UL (ref 0.9–3.6)
LYMPHOCYTES # BLD: 1.4 K/UL (ref 0.9–3.6)
LYMPHOCYTES # BLD: 1.4 K/UL (ref 0.9–3.6)
LYMPHOCYTES # BLD: 1.5 K/UL (ref 0.9–3.6)
LYMPHOCYTES # BLD: 1.6 K/UL (ref 0.9–3.6)
LYMPHOCYTES NFR BLD: 12 % (ref 21–52)
LYMPHOCYTES NFR BLD: 13 % (ref 21–52)
LYMPHOCYTES NFR BLD: 14 % (ref 21–52)
LYMPHOCYTES NFR BLD: 14 % (ref 21–52)
LYMPHOCYTES NFR BLD: 15 % (ref 21–52)
MCH RBC QN AUTO: 29.3 PG (ref 24–34)
MCH RBC QN AUTO: 29.8 PG (ref 24–34)
MCH RBC QN AUTO: 29.9 PG (ref 24–34)
MCH RBC QN AUTO: 29.9 PG (ref 24–34)
MCH RBC QN AUTO: 30 PG (ref 24–34)
MCHC RBC AUTO-ENTMCNC: 31 G/DL (ref 31–37)
MCHC RBC AUTO-ENTMCNC: 31.5 G/DL (ref 31–37)
MCHC RBC AUTO-ENTMCNC: 31.9 G/DL (ref 31–37)
MCHC RBC AUTO-ENTMCNC: 32 G/DL (ref 31–37)
MCHC RBC AUTO-ENTMCNC: 32.3 G/DL (ref 31–37)
MCV RBC AUTO: 92.3 FL (ref 74–97)
MCV RBC AUTO: 93.5 FL (ref 74–97)
MCV RBC AUTO: 94 FL (ref 74–97)
MCV RBC AUTO: 94.3 FL (ref 74–97)
MCV RBC AUTO: 94.9 FL (ref 74–97)
MONOCYTES # BLD: 0.5 K/UL (ref 0.05–1.2)
MONOCYTES # BLD: 0.7 K/UL (ref 0.05–1.2)
MONOCYTES NFR BLD: 5 % (ref 3–10)
MONOCYTES NFR BLD: 6 % (ref 3–10)
MONOCYTES NFR BLD: 6 % (ref 3–10)
MONOCYTES NFR BLD: 7 % (ref 3–10)
MONOCYTES NFR BLD: 7 % (ref 3–10)
NEUTS SEG # BLD: 7.8 K/UL (ref 1.8–8)
NEUTS SEG # BLD: 8.1 K/UL (ref 1.8–8)
NEUTS SEG # BLD: 8.3 K/UL (ref 1.8–8)
NEUTS SEG # BLD: 8.6 K/UL (ref 1.8–8)
NEUTS SEG # BLD: 9.5 K/UL (ref 1.8–8)
NEUTS SEG NFR BLD: 78 % (ref 40–73)
NEUTS SEG NFR BLD: 79 % (ref 40–73)
NEUTS SEG NFR BLD: 79 % (ref 40–73)
NEUTS SEG NFR BLD: 81 % (ref 40–73)
NEUTS SEG NFR BLD: 82 % (ref 40–73)
PLATELET # BLD AUTO: 120 K/UL (ref 135–420)
PLATELET # BLD AUTO: 124 K/UL (ref 135–420)
PLATELET # BLD AUTO: 132 K/UL (ref 135–420)
PLATELET # BLD AUTO: 133 K/UL (ref 135–420)
PLATELET # BLD AUTO: 138 K/UL (ref 135–420)
PMV BLD AUTO: 10.9 FL (ref 9.2–11.8)
PMV BLD AUTO: 11.1 FL (ref 9.2–11.8)
PMV BLD AUTO: 11.1 FL (ref 9.2–11.8)
PMV BLD AUTO: 11.2 FL (ref 9.2–11.8)
PMV BLD AUTO: 11.4 FL (ref 9.2–11.8)
PROTHROMBIN TIME: 15.3 SEC (ref 11.5–15.2)
RBC # BLD AUTO: 3.34 M/UL (ref 4.2–5.3)
RBC # BLD AUTO: 3.35 M/UL (ref 4.2–5.3)
RBC # BLD AUTO: 3.41 M/UL (ref 4.2–5.3)
RBC # BLD AUTO: 3.5 M/UL (ref 4.2–5.3)
RBC # BLD AUTO: 3.52 M/UL (ref 4.2–5.3)
WBC # BLD AUTO: 10 K/UL (ref 4.6–13.2)
WBC # BLD AUTO: 10.2 K/UL (ref 4.6–13.2)
WBC # BLD AUTO: 10.3 K/UL (ref 4.6–13.2)
WBC # BLD AUTO: 11 K/UL (ref 4.6–13.2)
WBC # BLD AUTO: 11.6 K/UL (ref 4.6–13.2)

## 2021-01-28 PROCEDURE — 74011000250 HC RX REV CODE- 250: Performed by: FAMILY MEDICINE

## 2021-01-28 PROCEDURE — 97535 SELF CARE MNGMENT TRAINING: CPT

## 2021-01-28 PROCEDURE — 65660000000 HC RM CCU STEPDOWN

## 2021-01-28 PROCEDURE — 85025 COMPLETE CBC W/AUTO DIFF WBC: CPT

## 2021-01-28 PROCEDURE — P9047 ALBUMIN (HUMAN), 25%, 50ML: HCPCS | Performed by: FAMILY MEDICINE

## 2021-01-28 PROCEDURE — 77010033678 HC OXYGEN DAILY

## 2021-01-28 PROCEDURE — C9113 INJ PANTOPRAZOLE SODIUM, VIA: HCPCS | Performed by: FAMILY MEDICINE

## 2021-01-28 PROCEDURE — 74011250636 HC RX REV CODE- 250/636: Performed by: FAMILY MEDICINE

## 2021-01-28 PROCEDURE — 74011250637 HC RX REV CODE- 250/637: Performed by: FAMILY MEDICINE

## 2021-01-28 PROCEDURE — 82962 GLUCOSE BLOOD TEST: CPT

## 2021-01-28 PROCEDURE — 97530 THERAPEUTIC ACTIVITIES: CPT

## 2021-01-28 PROCEDURE — 74176 CT ABD & PELVIS W/O CONTRAST: CPT

## 2021-01-28 PROCEDURE — 85610 PROTHROMBIN TIME: CPT

## 2021-01-28 PROCEDURE — 36415 COLL VENOUS BLD VENIPUNCTURE: CPT

## 2021-01-28 PROCEDURE — 74011250637 HC RX REV CODE- 250/637: Performed by: PHYSICIAN ASSISTANT

## 2021-01-28 RX ORDER — DILTIAZEM HYDROCHLORIDE 120 MG/1
120 CAPSULE, COATED, EXTENDED RELEASE ORAL DAILY
Status: DISCONTINUED | OUTPATIENT
Start: 2021-01-28 | End: 2021-02-01 | Stop reason: HOSPADM

## 2021-01-28 RX ORDER — INSULIN LISPRO 100 [IU]/ML
INJECTION, SOLUTION INTRAVENOUS; SUBCUTANEOUS
Status: CANCELLED | OUTPATIENT
Start: 2021-01-28

## 2021-01-28 RX ADMIN — HYDROCODONE BITARTRATE AND ACETAMINOPHEN 1 TABLET: 5; 325 TABLET ORAL at 21:14

## 2021-01-28 RX ADMIN — Medication 10 ML: at 00:21

## 2021-01-28 RX ADMIN — PANTOPRAZOLE SODIUM 40 MG: 40 INJECTION, POWDER, FOR SOLUTION INTRAVENOUS at 21:14

## 2021-01-28 RX ADMIN — ALBUMIN (HUMAN) 25 G: 0.25 INJECTION, SOLUTION INTRAVENOUS at 12:38

## 2021-01-28 RX ADMIN — HYDROCODONE BITARTRATE AND ACETAMINOPHEN 1 TABLET: 5; 325 TABLET ORAL at 16:55

## 2021-01-28 RX ADMIN — Medication 10 ML: at 14:00

## 2021-01-28 RX ADMIN — LEVOTHYROXINE, LIOTHYRONINE 60 MG: 19; 4.5 TABLET ORAL at 09:34

## 2021-01-28 RX ADMIN — ALBUMIN (HUMAN) 25 G: 0.25 INJECTION, SOLUTION INTRAVENOUS at 06:07

## 2021-01-28 RX ADMIN — HYDROCODONE BITARTRATE AND ACETAMINOPHEN 1 TABLET: 5; 325 TABLET ORAL at 12:42

## 2021-01-28 RX ADMIN — DILTIAZEM HYDROCHLORIDE 120 MG: 120 CAPSULE, COATED, EXTENDED RELEASE ORAL at 01:22

## 2021-01-28 RX ADMIN — HYDROCODONE BITARTRATE AND ACETAMINOPHEN 1 TABLET: 5; 325 TABLET ORAL at 06:07

## 2021-01-28 RX ADMIN — PANTOPRAZOLE SODIUM 40 MG: 40 INJECTION, POWDER, FOR SOLUTION INTRAVENOUS at 09:34

## 2021-01-28 RX ADMIN — HYDROCODONE BITARTRATE AND ACETAMINOPHEN 1 TABLET: 5; 325 TABLET ORAL at 01:22

## 2021-01-28 RX ADMIN — MONTELUKAST 10 MG: 10 TABLET, FILM COATED ORAL at 12:38

## 2021-01-28 RX ADMIN — SPIRONOLACTONE 12.5 MG: 25 TABLET ORAL at 12:38

## 2021-01-28 RX ADMIN — ALBUMIN (HUMAN) 25 G: 0.25 INJECTION, SOLUTION INTRAVENOUS at 17:50

## 2021-01-28 RX ADMIN — ALBUMIN (HUMAN) 25 G: 0.25 INJECTION, SOLUTION INTRAVENOUS at 23:51

## 2021-01-28 RX ADMIN — Medication 10 ML: at 06:08

## 2021-01-28 RX ADMIN — ALPRAZOLAM 0.5 MG: 0.5 TABLET ORAL at 21:14

## 2021-01-28 NOTE — PROGRESS NOTES
Problem: Mobility Impaired (Adult and Pediatric)  Goal: *Acute Goals and Plan of Care (Insert Text)  Description: In 1-5 days pt will be able to perform:  ST.  Bed mobility:  Rolling L to R to L modified independent for positioning. 2.  Supine to sit to supine CGA with HR for meals. 3.  Sit to stand to sit CGA with RW in prep for ambulation. LT.  Gait:  Ambulate >50ft CGA with RW, WBAT, for home/community mobility. 2.  Stair Negotiation:  Ascend/descend >1 step CGA with HR for home entry. 3.  Activity tolerance: Tolerate up in chair 1-2 hours for ADL's.  4.  Patient/Family Education:  Patient/family to be independent with HEP for follow-up care and safe discharge. Note:   PHYSICAL THERAPY TREATMENT    Patient: Vita Shaw (37 y.o. female)  Date: 2021  Diagnosis: Osteoarthritis of right knee [M17.11] <principal problem not specified>  Procedure(s) (LRB):  ESOPHAGOGASTRODUODENOSCOPY (EGD); CLIP PLACEMENT (N/A) 1 Day Post-Op  Precautions: Fall, WBAT(R TKA)   Chart, physical therapy assessment, plan of care and goals were reviewed. ASSESSMENT:  Pt continues to be adamant about going home. Upon initiating movement toward EOB and pt educated to move LE toward EOB; pt states \"I can't. \" Assisted with MaxA, minimal effort from patient. Attempted sit to stand from elevated bed x6 attempts, totalAx2 required during standing trials. Pt continues to decline rehab and wants to go home. Pt educated on mobility level and inability to stand or ambulate. Pt seems unreceptive to education. Pt educated on LE HEP to improve strength and flexibility. Will continue to progress pt with mobility. Will reassess based on pt progress however at this time strongly recommend rehab placement as safest d/c plan for patient and family. If pt continues to attempt home d/c, pt will need w/c, tessie left and hospital bed.    Progression toward goals:  []      Improving appropriately and progressing toward goals  [x] Improving slowly and progressing toward goals  []      Not making progress toward goals and plan of care will be adjusted     PLAN:  Patient continues to benefit from skilled intervention to address the above impairments. Continue treatment per established plan of care. Discharge Recommendations:  Skilled Nursing Facility  Further Equipment Recommendations for Discharge:  N/A     SUBJECTIVE:   Patient stated I want to leave now.     OBJECTIVE DATA SUMMARY:   Critical Behavior:  Neurologic State: Alert  Orientation Level: Oriented X4  Cognition: Decreased attention/concentration, Decreased command following, Impaired decision making, Memory loss, Poor safety awareness  Safety/Judgement: Decreased awareness of need for assistance, Decreased awareness of need for safety, Lack of insight into deficits  Functional Mobility Training:  Bed Mobility:  Rolling: Maximum assistance;Assist x1;Assist x2  Supine to Sit: Maximum assistance;Assist x1;Assist x2;Bed Modified(HOB elevated)  Sit to Supine: Maximum assistance; Total assistance;Assist x2  Scooting: Maximum assistance;Assist x2  Transfers:  Sit to Stand: Total assistance;Assist x2;Maximum assistance  Stand to Sit: Maximum assistance  Balance:  Sitting: Intact; High guard  Pain:  Pain Scale 1: Numeric (0 - 10)  Pain Intensity 1: 6  Activity Tolerance:   Fair  Please refer to the flowsheet for vital signs taken during this treatment.   After treatment:   [] Patient left in no apparent distress sitting up in chair  [x] Patient left in no apparent distress in bed  [x] Call bell left within reach  [x] Nursing notified  [] Caregiver present  [] Bed alarm activated      Lavern Phoenix Titcomb   Time Calculation: 53 mins

## 2021-01-28 NOTE — ROUTINE PROCESS
Bedside and Verbal shift change report given to [address] [place] [address]   (oncoming nurse) by Arya Negro  (offgoing nurse). Report included the following information SBAR, Kardex, Procedure Summary, Intake/Output, MAR and Recent Results.

## 2021-01-28 NOTE — PROGRESS NOTES
Problem: Self Care Deficits Care Plan (Adult)  Goal: *Acute Goals and Plan of Care (Insert Text)  Description: Initial Occupational Therapy Goals (1/26/2021) Within 7 day(s):    1. Patient will perform grooming seated on EOB with setup for increased independence in ADLs. 2. Patient will perform UB dressing with setup seated EOB for increased independence with ADLs. 3. Patient will perform LB dressing with Mod A & A/E PRN for increased independence with ADLs. 4. Patient will perform all aspects of toileting with Mod A for increased independence with ADLs. 5. Patient will perform LB ADLs utilizing body mechanics & adaptive strategies with 1 verbal cue for increased safety in ADLs. 6. Patient will independently apply energy conservation techniques with 2 verbal cue(s)for increased independence with ADLs. PLOF: Pt was requiring assist at baseline; only transferred from [facility] to [facility]. Lives alone in [facility]. Son was supposed to assist, but unable to perform dependent transfers. Outcome: Progressing Towards Goal   OCCUPATIONAL THERAPY TREATMENT    Patient: Kailey Sarah (60 y.o. female)  Date: 1/28/2021  Diagnosis: Osteoarthritis of right knee [M17.11] <principal problem not specified>  Procedure(s) (LRB):  ESOPHAGOGASTRODUODENOSCOPY (EGD); CLIP PLACEMENT (N/A) 1 Day Post-Op  Precautions: Fall, WBAT(R TKA)  PLOF: [see above]    Chart, occupational therapy assessment, plan of care, and goals were reviewed. ASSESSMENT:  Patient seen with PT for second set of skilled hands to maximize independence and safety. Pt w/ wheezing and poorly positioned. Pt reports she needs to pee, but when offered bedpan, she states she already went. Offered EOB and attempt to [facility] w/ pt agreeable. Pt w/ max cues for use of bedrail and to move herself. Able to move LLE initially, but then needing encouragement to continue. Assist w/ RLE mgmt. Pt sat EOB.  Two attempts to stand w/ bed elevated w/ pt immediately removing LUE (leverage point on bed) and becoming dependent assist of 2+. Pt assisted w/ lateral scooting alongside bed. Pt able to sit for rest break, but began scooting forward nearly off bed. Pt assisted in rolling w/ maxA and encouragement in order for hygiene and replacement of dry linens. Encouraged use of [place]wick to RN/Kyler. Progression toward goals:  []          Improving appropriately and progressing toward goals  [x]          Improving slowly and progressing toward goals  []          Not making progress toward goals and plan of care will be adjusted     PLAN:  Patient continues to benefit from skilled intervention to address the above impairments. Continue treatment per established plan of care. Discharge Recommendations:  Rehab  Further Equipment Recommendations for Discharge: To Be Determined (TBD) at next level of care      SUBJECTIVE:   Patient stated I just want to get out of here.     OBJECTIVE DATA SUMMARY:   Cognitive/Behavioral Status:  Neurologic State: Alert  Orientation Level: Oriented X4  Cognition: Decreased attention/concentration, Decreased command following, Impaired decision making, Memory loss, Poor safety awareness  Safety/Judgement: Decreased awareness of need for assistance, Decreased awareness of need for safety, Lack of insight into deficits    Functional Mobility and Transfers for ADLs:   Bed Mobility:  Rolling: Maximum assistance;Assist x1;Assist x2  Supine to Sit: Maximum assistance;Assist x1;Assist x2;Bed Modified(HOB elevated)  Sit to Supine: Maximum assistance; Total assistance;Assist x2  Scooting: Maximum assistance;Assist x2   Transfers:  Sit to Stand: Total assistance;Assist x2;Maximum assistance    Balance:  Sitting: Intact; High guard    ADL Intervention:  Feeding  Food to Mouth: Contact guard assistance  Drink to Mouth: Modified independent    Grooming  Washing Hands: Set-up(hand wipes)    Upper Body [address]:  Moderate assistance    Lower Body Dressing Assistance  Socks: Total assistance (dependent)    Toileting  Toileting Assistance: Total assistance(dependent)  Bladder Hygiene: Total assistance (dependent)  Bowel Hygiene: Total assistance (dependent)  Clothing Management: Total assistance (dependent)  Adaptive Equipment: (bedpan)    Cognitive Retraining  Problem Solving: Inductive reason; Identifying the task; Identifying the problem;General alternative solution;Deductive reason; Awareness of environment  Executive Functions: Executing cognitive plans;Managing time;Regulating behavior  Organizing/Sequencing: Breaking task down;Prioritizing  Attention to Task: Distractibility; Single task  Safety/Judgement: Decreased awareness of need for assistance;Decreased awareness of need for safety; Lack of insight into deficits    Pain:  Pain level pre-treatment: 4/10 (6/10 at its worst)  Pain level post-treatment: 4/10  Pain Intervention(s): Medication administered by RN (see MAR); Rest, Ice, Repositioning   Response to intervention: Nurse notified, See doc flow sheet    Please refer to the flowsheet for vital signs taken during this treatment. After treatment:   []  Patient left in no apparent distress sitting up in chair  []  Patient left in no apparent distress in bed  [x]  Call bell left within reach  [x]  Nursing notified  []  Caregiver present  []  Bed alarm activated    COMMUNICATION/EDUCATION:   [x] Role of Occupational Therapy in the acute care setting  [x] Home safety education was provided and the patient/caregiver indicated understanding. [x] Patient/family have participated as able in working towards goals and plan of care. [x] Patient/family agree to work toward stated goals and plan of care. [] Patient understands intent and goals of therapy, but is neutral about his/her participation. [] Patient is unable to participate in goal setting and plan of care.       Thank you for this referral.  Kasandra Rao, OTR/L, CSRS, CDCS, CFPS  Time Calculation: 64 mins

## 2021-01-28 NOTE — PROGRESS NOTES
Progress Note     Patient: Micah Black MRN: [id number]  SSN: [id number]    YOB: 1950  Age: 69 y.o. Sex: female      POD:    3 Day Post-Op  S/P:    Procedure(s):  R TKR     Subjective:   *Patient much more responsive this morning*   Pt is without complaints of pain in the knee; she states that she has not worked much with PT thus far and has not walked or moved the knee. Patient states that she is very frustrated that she cannot go home and does not want to proceed with the suggested colonoscopy. She is threatening to sign out AMA because she just \"wants to go home. \"       Objective:     Patient Vitals for the past 12 hrs:   BP Temp Pulse Resp SpO2   01/28/21 0723 110/65 98 °F (36.7 °C) 85 18 97 %   01/28/21 0336 122/65  (!) 55 20 98 %   01/28/21 0120 129/83  (!) 115  98 %   01/27/21 2203 129/67 98.8 °F (37.1 °C) (!) 115 20 95 %     Recent Labs     01/28/21  0530 01/28/21  0055 01/27/21  2030   HGB 10.0* 10.2* 11.2*   HCT 31.7* 31.9* 35.3   INR  --  1.2  --    NA  --   --  141   K  --   --  4.1   CL  --   --  107   CO2  --   --  33*   BUN  --   --  24*   CREA  --   --  0.72   GLU  --   --  139*       Pt. resting in bed. Lower extremity operative dressing clean/dry/intact. Neurovascularly intact. Assessment:     Awake and alert. Plan:   1. Continue pain management/ ice to operative area. 2. Continue to progress with PT/OT. 3. Discharge planning to home with home health vs.skilled nursing facility vs. inpatient rehab. 4. Discussed disposition with patient this morning. Patient was informed that the GI and hospitalist teams are doing their best to figure our why the patient had episodes of bloody stools and where the bleeding is coming from so that she may be safely discharged home. She is strongly encouraged to stay in the hospital in order to let the GI/hospitalist teams perform appropriate tests so that she may be safely discharged.  She is informed that if she leaves AMA she has a significant risk of complications due to her complicated medical history and episode of bleeding in the hospital.

## 2021-01-28 NOTE — PROGRESS NOTES
Problem: Falls - Risk of  Goal: *Absence of Falls  Description: Document Mikal Soraya Fall Risk and appropriate interventions in the flowsheet.   Outcome: Progressing Towards Goal  Note: Fall Risk Interventions:  Mobility Interventions: Communicate number of staff needed for ambulation/transfer    Mentation Interventions: Adequate sleep, hydration, pain control    Medication Interventions: Evaluate medications/consider consulting pharmacy    Elimination Interventions: Call light in reach    History of Falls Interventions: Door open when patient unattended

## 2021-01-28 NOTE — PROGRESS NOTES
In pt chart for peer review. 0036 Pt rhythm afib with rvr her 110-140's provider made aware. Will notify primary RN. Bp's are 120/80's.

## 2021-01-28 NOTE — PROGRESS NOTES
Reason for Admission: Complications s/p right knee replacement    Chart reviewed. Per Dr. Marah Tim H&P: Luisito Lane is a 18-year-old female who was admitted on Monday for right knee replacement for severe osteoarthritis. Yesterday, she had multiple bloody burgundy stools that continued until today, and then, her hemoglobin dropped to 7.7 at 11:25, but on repeat at 3:35, it was 10.4. The patient apparently has not received a blood transfusion yet. Her platelets dropped from 143 to 91 and now is 122. The patient used to be on Coumadin for 3 years for chronic atrial fibrillation, but this has been on hold for about a week before the surgery and was switched to Lovenox to prevent deep vein thrombosis. The patient has been receiving multiple doses of narcotics to control her right knee pain and had not been able to get up. This morning, she had an ILSA because of hypoxemia caused by narcotic overdose. She responded to the administration of Narcan and was put on oxygen. The patient claimed that yesterday she had been having multiple diarrhea, but she did not look to see what color it was, and today, she says she has not had a bowel movement. We tried to send her for a CT scan angio, but she was severely allergic to IV contrast with anaphylaxis. She was unstable to go for the GI bleeding scan. Presently, she is stable. Her blood pressure dropped to 86/61 around 11:46, and yesterday evening around 5:00 pm, it dropped to 86/57. Her heart rate has remained stable. \"    2042: CM spoke with the patient and discussed discharge plans. The patient states that she is ready to leave and that she wants to go home. CM informed the patient that PT is recommending rehab/SNF. The patient states that she really doesn't want go to a facility. The patient states that her plan is to go to stay at her friends house and that her son will be there to take care of her indefinitely.  CM received permission to call and speak with the patient's son, Samantha Thomas. CM spoke with Samantha Thomas and informed him that the patient would prefer to come home with home health and family assistance but therapy is recommending rehab/SNF. Samantha Thomas states that he will speak with the patient and see what the best discharge plan is. Prior to admission patient was living: At home alone with family nearby    Prior to admission patient was using the following DME: Quad cane, has a walker as well                    RUR Score: 14%                    Plan for utilizing home health: TBD          PCP: First and Last name: Dr. Misty Mcdaniel    Name of Practice[facility]   Are you a current patient: Yes/No: Yes   Approximate date of last visit: Last week   Can you participate in a virtual visit with your PCP:                      Current Advanced Directive/Advance Care Plan: Advanced directive on file    Healthcare Decision Maker: Lynn Ortez                         Transition of Care Plan: In progress, SNF vs home health       Care Management Interventions  PCP Verified by CM:  Yes  Transition of Care Consult (CM Consult): Discharge Planning  Physical Therapy Consult: Yes  Occupational Therapy Consult: Yes  Current Support Network: Family Lives Nearby, Lives Alone(lives alone but plans to stay at a friends house with her son upon discharge)  The Plan for Transition of Care is Related to the Following Treatment Goals : Right knee replacement

## 2021-01-28 NOTE — PROGRESS NOTES
0122 Cardizem and Norco given at this time. Pt is incontinent of urine, also noted blood on bedpad    0246 Pt taken down to CT    0335 Back from CT at this time, pt tolerated fairly    0600 Pt noted to have periods of confusion    0800 Verbal shift change report given to Hoda Ware RN (oncoming nurse) by Cornelius Henry RN   (offgoing nurse). Report included the following information SBAR, Kardex, Intake/Output, MAR, Recent Results and Cardiac Rhythm A Fib .

## 2021-01-29 LAB
ABO + RH BLD: NORMAL
BASOPHILS # BLD: 0 K/UL (ref 0–0.1)
BASOPHILS NFR BLD: 0 % (ref 0–2)
BASOPHILS NFR BLD: 0 % (ref 0–3)
BASOPHILS NFR BLD: 0 % (ref 0–3)
BLD PROD TYP BPU: NORMAL
BLD PROD TYP BPU: NORMAL
BLOOD GROUP ANTIBODIES SERPL: NORMAL
BPU ID: NORMAL
BPU ID: NORMAL
CALLED TO:,BCALL1: NORMAL
COVID-19 RAPID TEST, COVR: NOT DETECTED
CROSSMATCH RESULT,%XM: NORMAL
CROSSMATCH RESULT,%XM: NORMAL
DIFFERENTIAL METHOD BLD: ABNORMAL
EOSINOPHIL # BLD: 0 K/UL (ref 0–0.4)
EOSINOPHIL # BLD: 0.1 K/UL (ref 0–0.4)
EOSINOPHIL # BLD: 0.1 K/UL (ref 0–0.4)
EOSINOPHIL # BLD: 0.2 K/UL (ref 0–0.4)
EOSINOPHIL # BLD: 0.5 K/UL (ref 0–0.4)
EOSINOPHIL NFR BLD: 0 % (ref 0–5)
EOSINOPHIL NFR BLD: 1 % (ref 0–5)
EOSINOPHIL NFR BLD: 1 % (ref 0–5)
EOSINOPHIL NFR BLD: 2 % (ref 0–5)
EOSINOPHIL NFR BLD: 4 % (ref 0–5)
ERYTHROCYTE [DISTWIDTH] IN BLOOD BY AUTOMATED COUNT: 13.5 % (ref 11.6–14.5)
ERYTHROCYTE [DISTWIDTH] IN BLOOD BY AUTOMATED COUNT: 13.5 % (ref 11.6–14.5)
ERYTHROCYTE [DISTWIDTH] IN BLOOD BY AUTOMATED COUNT: 13.6 % (ref 11.6–14.5)
ERYTHROCYTE [DISTWIDTH] IN BLOOD BY AUTOMATED COUNT: 13.6 % (ref 11.6–14.5)
ERYTHROCYTE [DISTWIDTH] IN BLOOD BY AUTOMATED COUNT: 13.7 % (ref 11.6–14.5)
GLUCOSE BLD STRIP.AUTO-MCNC: 108 MG/DL (ref 70–110)
GLUCOSE BLD STRIP.AUTO-MCNC: 134 MG/DL (ref 70–110)
GLUCOSE BLD STRIP.AUTO-MCNC: 148 MG/DL (ref 70–110)
GLUCOSE BLD STRIP.AUTO-MCNC: 161 MG/DL (ref 70–110)
H PYLORI IGA SER-ACNC: <9 UNITS (ref 0–8.9)
H PYLORI IGG SER IA-ACNC: 0.57 INDEX VALUE (ref 0–0.79)
HCT VFR BLD AUTO: 33.7 % (ref 35–45)
HCT VFR BLD AUTO: 34.3 % (ref 35–45)
HCT VFR BLD AUTO: 34.8 % (ref 35–45)
HCT VFR BLD AUTO: 35.1 % (ref 35–45)
HCT VFR BLD AUTO: 36.2 % (ref 35–45)
HGB BLD-MCNC: 10.6 G/DL (ref 12–16)
HGB BLD-MCNC: 10.8 G/DL (ref 12–16)
HGB BLD-MCNC: 11 G/DL (ref 12–16)
HGB BLD-MCNC: 11.2 G/DL (ref 12–16)
HGB BLD-MCNC: 11.5 G/DL (ref 12–16)
INR PPP: 1.2 (ref 0.8–1.2)
LYMPHOCYTES # BLD: 0.5 K/UL (ref 0.8–3.5)
LYMPHOCYTES # BLD: 1.2 K/UL (ref 0.8–3.5)
LYMPHOCYTES # BLD: 1.2 K/UL (ref 0.9–3.6)
LYMPHOCYTES # BLD: 1.3 K/UL (ref 0.9–3.6)
LYMPHOCYTES # BLD: 1.5 K/UL (ref 0.9–3.6)
LYMPHOCYTES NFR BLD: 11 % (ref 20–51)
LYMPHOCYTES NFR BLD: 12 % (ref 21–52)
LYMPHOCYTES NFR BLD: 12 % (ref 21–52)
LYMPHOCYTES NFR BLD: 14 % (ref 21–52)
LYMPHOCYTES NFR BLD: 4 % (ref 20–51)
MCH RBC QN AUTO: 28.3 PG (ref 24–34)
MCH RBC QN AUTO: 29 PG (ref 24–34)
MCH RBC QN AUTO: 29.1 PG (ref 24–34)
MCH RBC QN AUTO: 29.3 PG (ref 24–34)
MCH RBC QN AUTO: 29.9 PG (ref 24–34)
MCHC RBC AUTO-ENTMCNC: 30.9 G/DL (ref 31–37)
MCHC RBC AUTO-ENTMCNC: 31.6 G/DL (ref 31–37)
MCHC RBC AUTO-ENTMCNC: 31.8 G/DL (ref 31–37)
MCHC RBC AUTO-ENTMCNC: 31.9 G/DL (ref 31–37)
MCHC RBC AUTO-ENTMCNC: 32 G/DL (ref 31–37)
MCV RBC AUTO: 91.2 FL (ref 74–97)
MCV RBC AUTO: 91.5 FL (ref 74–97)
MCV RBC AUTO: 91.8 FL (ref 74–97)
MCV RBC AUTO: 92.3 FL (ref 74–97)
MCV RBC AUTO: 93.4 FL (ref 74–97)
MONOCYTES # BLD: 0.7 K/UL (ref 0.05–1.2)
MONOCYTES # BLD: 0.9 K/UL (ref 0.05–1.2)
MONOCYTES # BLD: 0.9 K/UL (ref 0.05–1.2)
MONOCYTES # BLD: 0.9 K/UL (ref 0–1)
MONOCYTES # BLD: 1 K/UL (ref 0–1)
MONOCYTES NFR BLD: 7 % (ref 3–10)
MONOCYTES NFR BLD: 8 % (ref 2–9)
MONOCYTES NFR BLD: 8 % (ref 3–10)
MONOCYTES NFR BLD: 8 % (ref 3–10)
MONOCYTES NFR BLD: 9 % (ref 2–9)
NEUTS BAND NFR BLD MANUAL: 11 % (ref 0–5)
NEUTS BAND NFR BLD MANUAL: 3 % (ref 0–5)
NEUTS SEG # BLD: 7.6 K/UL (ref 1.8–8)
NEUTS SEG # BLD: 8 K/UL (ref 1.8–8)
NEUTS SEG # BLD: 8.1 K/UL (ref 1.8–8)
NEUTS SEG # BLD: 8.4 K/UL (ref 1.8–8)
NEUTS SEG # BLD: 9 K/UL (ref 1.8–8)
NEUTS SEG NFR BLD: 70 % (ref 42–75)
NEUTS SEG NFR BLD: 77 % (ref 40–73)
NEUTS SEG NFR BLD: 79 % (ref 40–73)
NEUTS SEG NFR BLD: 79 % (ref 40–73)
NEUTS SEG NFR BLD: 80 % (ref 42–75)
NRBC BLD-RTO: 1 PER 100 WBC
NRBC BLD-RTO: 3 PER 100 WBC
PLATELET # BLD AUTO: 145 K/UL (ref 135–420)
PLATELET # BLD AUTO: 153 K/UL (ref 135–420)
PLATELET # BLD AUTO: 163 K/UL (ref 135–420)
PLATELET # BLD AUTO: 168 K/UL (ref 135–420)
PLATELET # BLD AUTO: 175 K/UL (ref 135–420)
PLATELET COMMENTS,PCOM: ABNORMAL
PLATELET COMMENTS,PCOM: ABNORMAL
PMV BLD AUTO: 11 FL (ref 9.2–11.8)
PMV BLD AUTO: 11 FL (ref 9.2–11.8)
PMV BLD AUTO: 11.2 FL (ref 9.2–11.8)
PMV BLD AUTO: 11.2 FL (ref 9.2–11.8)
PMV BLD AUTO: 11.8 FL (ref 9.2–11.8)
PROTHROMBIN TIME: 14.6 SEC (ref 11.5–15.2)
RBC # BLD AUTO: 3.61 M/UL (ref 4.2–5.3)
RBC # BLD AUTO: 3.75 M/UL (ref 4.2–5.3)
RBC # BLD AUTO: 3.79 M/UL (ref 4.2–5.3)
RBC # BLD AUTO: 3.85 M/UL (ref 4.2–5.3)
RBC # BLD AUTO: 3.92 M/UL (ref 4.2–5.3)
RBC MORPH BLD: ABNORMAL
SARS-COV-2, COV2: NORMAL
SOURCE, COVRS: NORMAL
SPECIMEN EXP DATE BLD: NORMAL
STATUS OF UNIT,%ST: NORMAL
STATUS OF UNIT,%ST: NORMAL
UNIT DIVISION, %UDIV: 0
UNIT DIVISION, %UDIV: 0
WBC # BLD AUTO: 10.1 K/UL (ref 4.6–13.2)
WBC # BLD AUTO: 10.6 K/UL (ref 4.6–13.2)
WBC # BLD AUTO: 10.7 K/UL (ref 4.6–13.2)
WBC # BLD AUTO: 10.8 K/UL (ref 4.6–13.2)
WBC # BLD AUTO: 11.3 K/UL (ref 4.6–13.2)

## 2021-01-29 PROCEDURE — 85610 PROTHROMBIN TIME: CPT

## 2021-01-29 PROCEDURE — 87635 SARS-COV-2 COVID-19 AMP PRB: CPT

## 2021-01-29 PROCEDURE — 65660000000 HC RM CCU STEPDOWN

## 2021-01-29 PROCEDURE — C9113 INJ PANTOPRAZOLE SODIUM, VIA: HCPCS | Performed by: FAMILY MEDICINE

## 2021-01-29 PROCEDURE — P9047 ALBUMIN (HUMAN), 25%, 50ML: HCPCS | Performed by: FAMILY MEDICINE

## 2021-01-29 PROCEDURE — 74011636637 HC RX REV CODE- 636/637: Performed by: FAMILY MEDICINE

## 2021-01-29 PROCEDURE — 85025 COMPLETE CBC W/AUTO DIFF WBC: CPT

## 2021-01-29 PROCEDURE — 74011250637 HC RX REV CODE- 250/637: Performed by: PHYSICIAN ASSISTANT

## 2021-01-29 PROCEDURE — 74011250637 HC RX REV CODE- 250/637: Performed by: FAMILY MEDICINE

## 2021-01-29 PROCEDURE — 82962 GLUCOSE BLOOD TEST: CPT

## 2021-01-29 PROCEDURE — 36415 COLL VENOUS BLD VENIPUNCTURE: CPT

## 2021-01-29 PROCEDURE — 74011250636 HC RX REV CODE- 250/636: Performed by: PHYSICIAN ASSISTANT

## 2021-01-29 PROCEDURE — 74011000250 HC RX REV CODE- 250: Performed by: FAMILY MEDICINE

## 2021-01-29 PROCEDURE — 74011250636 HC RX REV CODE- 250/636: Performed by: FAMILY MEDICINE

## 2021-01-29 PROCEDURE — 77010033678 HC OXYGEN DAILY

## 2021-01-29 PROCEDURE — 97530 THERAPEUTIC ACTIVITIES: CPT

## 2021-01-29 PROCEDURE — 77030027138 HC INCENT SPIROMETER -A

## 2021-01-29 RX ORDER — SODIUM CHLORIDE 9 MG/ML
1000 INJECTION, SOLUTION INTRAVENOUS CONTINUOUS
Status: CANCELLED | OUTPATIENT
Start: 2021-01-29

## 2021-01-29 RX ORDER — DIPHENHYDRAMINE HYDROCHLORIDE 50 MG/ML
50 INJECTION, SOLUTION INTRAMUSCULAR; INTRAVENOUS ONCE
Status: CANCELLED | OUTPATIENT
Start: 2021-01-29 | End: 2021-01-29

## 2021-01-29 RX ORDER — MIDAZOLAM HYDROCHLORIDE 1 MG/ML
.25-5 INJECTION, SOLUTION INTRAMUSCULAR; INTRAVENOUS
Status: CANCELLED | OUTPATIENT
Start: 2021-01-29 | End: 2021-01-29

## 2021-01-29 RX ORDER — FLUMAZENIL 0.1 MG/ML
0.2 INJECTION INTRAVENOUS
Status: CANCELLED | OUTPATIENT
Start: 2021-01-29 | End: 2021-01-29

## 2021-01-29 RX ORDER — SODIUM CHLORIDE 0.9 % (FLUSH) 0.9 %
5-40 SYRINGE (ML) INJECTION AS NEEDED
Status: CANCELLED | OUTPATIENT
Start: 2021-01-29

## 2021-01-29 RX ORDER — FENTANYL CITRATE 50 UG/ML
100 INJECTION, SOLUTION INTRAMUSCULAR; INTRAVENOUS
Status: CANCELLED | OUTPATIENT
Start: 2021-01-29 | End: 2021-01-29

## 2021-01-29 RX ORDER — DEXTROMETHORPHAN/PSEUDOEPHED 2.5-7.5/.8
1.2 DROPS ORAL
Status: CANCELLED | OUTPATIENT
Start: 2021-01-29

## 2021-01-29 RX ORDER — NALOXONE HYDROCHLORIDE 0.4 MG/ML
0.4 INJECTION, SOLUTION INTRAMUSCULAR; INTRAVENOUS; SUBCUTANEOUS
Status: CANCELLED | OUTPATIENT
Start: 2021-01-29 | End: 2021-01-29

## 2021-01-29 RX ORDER — SODIUM CHLORIDE 9 MG/ML
1000 INJECTION, SOLUTION INTRAVENOUS CONTINUOUS
Status: CANCELLED | OUTPATIENT
Start: 2021-01-29 | End: 2021-01-29

## 2021-01-29 RX ORDER — METOCLOPRAMIDE HYDROCHLORIDE 5 MG/ML
5 INJECTION INTRAMUSCULAR; INTRAVENOUS
Status: DISCONTINUED | OUTPATIENT
Start: 2021-01-29 | End: 2021-02-01 | Stop reason: HOSPADM

## 2021-01-29 RX ORDER — ATROPINE SULFATE 0.1 MG/ML
0.5 INJECTION INTRAVENOUS
Status: CANCELLED | OUTPATIENT
Start: 2021-01-29 | End: 2021-01-30

## 2021-01-29 RX ORDER — SODIUM CHLORIDE 0.9 % (FLUSH) 0.9 %
5-40 SYRINGE (ML) INJECTION EVERY 8 HOURS
Status: CANCELLED | OUTPATIENT
Start: 2021-01-29

## 2021-01-29 RX ADMIN — SPIRONOLACTONE 12.5 MG: 25 TABLET ORAL at 14:26

## 2021-01-29 RX ADMIN — ALBUMIN (HUMAN) 25 G: 0.25 INJECTION, SOLUTION INTRAVENOUS at 07:04

## 2021-01-29 RX ADMIN — CARVEDILOL 25 MG: 12.5 TABLET, FILM COATED ORAL at 21:50

## 2021-01-29 RX ADMIN — LEVOTHYROXINE, LIOTHYRONINE 60 MG: 19; 4.5 TABLET ORAL at 09:20

## 2021-01-29 RX ADMIN — HYDROCODONE BITARTRATE AND ACETAMINOPHEN 1 TABLET: 5; 325 TABLET ORAL at 01:44

## 2021-01-29 RX ADMIN — PANTOPRAZOLE SODIUM 40 MG: 40 INJECTION, POWDER, FOR SOLUTION INTRAVENOUS at 09:21

## 2021-01-29 RX ADMIN — HYDROCODONE BITARTRATE AND ACETAMINOPHEN 1 TABLET: 5; 325 TABLET ORAL at 09:20

## 2021-01-29 RX ADMIN — Medication 10 ML: at 14:00

## 2021-01-29 RX ADMIN — PANTOPRAZOLE SODIUM 40 MG: 40 INJECTION, POWDER, FOR SOLUTION INTRAVENOUS at 21:46

## 2021-01-29 RX ADMIN — MONTELUKAST 10 MG: 10 TABLET, FILM COATED ORAL at 14:26

## 2021-01-29 RX ADMIN — ALBUMIN (HUMAN) 25 G: 0.25 INJECTION, SOLUTION INTRAVENOUS at 17:58

## 2021-01-29 RX ADMIN — CARVEDILOL 25 MG: 12.5 TABLET, FILM COATED ORAL at 17:58

## 2021-01-29 RX ADMIN — HYDROCODONE BITARTRATE AND ACETAMINOPHEN 1 TABLET: 5; 325 TABLET ORAL at 21:46

## 2021-01-29 RX ADMIN — ALPRAZOLAM 0.5 MG: 0.5 TABLET ORAL at 21:46

## 2021-01-29 RX ADMIN — DILTIAZEM HYDROCHLORIDE 120 MG: 120 CAPSULE, COATED, EXTENDED RELEASE ORAL at 09:21

## 2021-01-29 RX ADMIN — ENOXAPARIN SODIUM 30 MG: 30 INJECTION SUBCUTANEOUS at 17:58

## 2021-01-29 RX ADMIN — HYDROCODONE BITARTRATE AND ACETAMINOPHEN 1 TABLET: 5; 325 TABLET ORAL at 14:31

## 2021-01-29 RX ADMIN — ALBUMIN (HUMAN) 25 G: 0.25 INJECTION, SOLUTION INTRAVENOUS at 14:27

## 2021-01-29 RX ADMIN — INSULIN LISPRO 2 UNITS: 100 INJECTION, SOLUTION INTRAVENOUS; SUBCUTANEOUS at 17:58

## 2021-01-29 NOTE — PROGRESS NOTES
Problem: Mobility Impaired (Adult and Pediatric)  Goal: *Acute Goals and Plan of Care (Insert Text)  Description: In 1-5 days pt will be able to perform:  ST.  Bed mobility:  Rolling L to R to L modified independent for positioning. 2.  Supine to sit to supine CGA with HR for meals. 3.  Sit to stand to sit CGA with RW in prep for ambulation. LT.  Gait:  Ambulate >50ft CGA with RW, WBAT, for home/community mobility. 2.  Stair Negotiation:  Ascend/descend >1 step CGA with HR for home entry. 3.  Activity tolerance: Tolerate up in chair 1-2 hours for ADL's.  4.  Patient/Family Education:  Patient/family to be independent with HEP for follow-up care and safe discharge. Outcome: Progressing Towards Goal   []  Patient has met MD mobilization critieria for d/c home   []  Recommend HH with 24 hour adult care   [x]  Benefit from additional acute PT session to address:  SNF placement    PHYSICAL THERAPY TREATMENT    Patient: Tigist Gonzalez (54 y.o. female)  Date: 2021  Diagnosis: Osteoarthritis of right knee [M17.11] <principal problem not specified>  Procedure(s) (LRB):  ESOPHAGOGASTRODUODENOSCOPY (EGD); CLIP PLACEMENT (N/A) 2 Days Post-Op  Precautions: Fall, WBAT(R TKA)  PLOF: ambulatory with a cane    ASSESSMENT:  Pt makes very little effort to mobilize due to pain (6-7/10). Maximized use of bed controls to assist with bed mobility and pt still required max/totA to sit up EOB. Pt utilized a bed sheet to assist with moving RLE laterally off the EOB. Bed elevated almost into standing for the pt but pt unable to push self up into full standing. Lowered bed and pt minimally assisted with scooting up to [facility] to make room for LEs to clear the foot board. MaxA with (B)LEs in to supine. Placed bed into full trendelenburg position and pt assisted with scooting up to [facility] using head board rail.   Progression toward goals:   []      Improving appropriately and progressing toward goals  [x]      Improving slowly and progressing toward goals  []      Not making progress toward goals and plan of care will be adjusted     PLAN:  Patient continues to benefit from skilled intervention to address the above impairments. Continue treatment per established plan of care. Discharge Recommendations:  [facility]  Further Equipment Recommendations for Discharge:  wide base rolling walker     SUBJECTIVE:   Patient stated I can't I need you to help me.     OBJECTIVE DATA SUMMARY:   Critical Behavior:  Neurologic State: Alert  Orientation Level: Oriented X4  Cognition: Follows commands  Safety/Judgement: Decreased awareness of need for assistance, Decreased awareness of need for safety, Lack of insight into deficits  Functional Mobility Training:  Bed Mobility:  Rolling: Maximum assistance; Total assistance  Supine to Sit: Maximum assistance  Sit to Supine: Maximum assistance  Scooting: Maximum assistance; Total assistance  Balance:  Sitting: Intact; With support   Pain:  Pain level pre-treatment: 6/10  Pain level post-treatment: 7/10   Pain Intervention(s): Medication (see MAR); Rest, Ice, Repositioning   Response to intervention: Nurse notified, See doc flow    Activity Tolerance:   poor  Please refer to the flowsheet for vital signs taken during this treatment. After treatment:   [] Patient left in no apparent distress sitting up in chair  [x] Patient left in no apparent distress in bed  [x] Call bell left within reach  [x] Nursing notified  [] Caregiver present  [] Bed alarm activated  [] SCDs applied      COMMUNICATION/EDUCATION:   [x]         Role of Physical Therapy in the acute care setting. [x]         Fall prevention education was provided and the patient/caregiver indicated understanding. [x]         Patient/family have participated as able in working toward goals and plan of care. [x]         Patient/family agree to work toward stated goals and plan of care.   []         Patient understands intent and goals of therapy, but is neutral about his/her participation.   []         Patient is unable to participate in stated goals/plan of care: ongoing with therapy staff.  []         Other:        Lacie Roberts, YEN   Time Calculation: 33 mins

## 2021-01-29 NOTE — PROGRESS NOTES
Discharge Planning: SNF vs home with home health    Patient considering going to a SNF upon discharge. Colonoscopy scheduled for Monday, CM to follow the patient's progress and be available to assist as needed. 1205: Order placed for a rapid covid test to help facilitate potential placement. Nurse Mary Jo and Ms. Debra Mckeon in the lab both made aware. 1415: CM spoke with the patient and the patient confirms that she wants to have a colonoscopy done on Monday as recommended by . Care Management Interventions  PCP Verified by CM:  Yes  Transition of Care Consult (CM Consult): Discharge Planning  Physical Therapy Consult: Yes  Occupational Therapy Consult: Yes  Current Support Network: Family Lives Nearby, Lives Alone(lives alone but plans to stay at a friends house with her son upon discharge)  The Plan for Transition of Care is Related to the Following Treatment Goals : Right knee replacement

## 2021-01-29 NOTE — ROUTINE PROCESS
Bedside and Verbal shift change report given to ANGELICA Parker  (oncoming nurse) by Rachel Blue  (offgoing nurse). Report included the following information SBAR, Kardex, Intake/Output and Recent Results.

## 2021-01-29 NOTE — PROGRESS NOTES
Hospitalist Progress Note    Patient: Micah Black MRN: [id number]  CSN: [id number]    YOB: 1950  Age: 69 y.o. Sex: female    DOA: 1/25/2021 LOS:  LOS: 2 days                Assessment/Plan     Patient Active Problem List   Diagnosis Code    Osteoarthritis of right knee M17.11    Atrial fibrillation (HCC) I48.91    GI bleed K92.2    Hypotension I95.9            70 y/o female with chronic atrial fibrillation, CHF, HTN, and morbid obesity status post right knee total arthroplasty, medicine consulted for gastrointestinal bleeding and hypotension. She does not want colonoscopy.     Hypotension -  IV fluid bolus given x1 due to CHF  Hold coreg and diltiazem, add back carefully to balance the risk of precipitating RVR with hypotension. BP now elevated  Started on coreg.     GI bleed-  S/p EGD with findings of There are multiple prepyloric linear long erosions and ulcerations with congested erythematous mucosa. Confluent superficial hemorrhagic ulcerations in the distal bulb covered with flat clots and extending into the distal duodenum up to D3. In the distal bulb where there is a large visible vessel with sentinel clot not actively bleeding or oozing. One clip is applied. There is a mass effect compressing the proximal part of the D2 suggestive of pancreatic neoplasm? ? But without visible mucosal invasion. CT abdomen and pelvis with no pancreatic mass. Left staghorn calculus    H/H stable    Ok to start on DVT prophylaxis with lovenox per GI     Atrial fibrillation -  hold coumadin for GI bleeding  hold coreg and diltiazem due to hypotension and restart carefully depending on BP    AMY -  CPAP at night. Follow up with urology for staghorn calculi    BMI of 46.4     Osteoarthritis of R knee  S/p right TKA    DVT prophylaxis with lovenox.          Review of systems  General: No fevers or chills. Cardiovascular: No chest pain or pressure. No palpitations.    Pulmonary: No shortness of breath. Gastrointestinal: No nausea, vomiting. Physical Exam:  General: Awake, cooperative, no acute distress    HEENT: NC, Atraumatic. PERRLA, anicteric sclerae. Lungs: CTA Bilaterally. Upper airway rhonchi. Heart:  S1 S2,  No murmur, No Rubs, No Gallops  Abdomen: Soft, Non distended, Non tender.  +Bowel sounds,   Extremities: Right knee with dressing  Psych:   Not anxious or agitated. Neurologic:  No acute neurological deficit. Vital signs/Intake and Output:  Visit Vitals  BP (!) 154/87   Pulse 85   Temp 98.2 °F (36.8 °C)   Resp 24   Ht 5' 8.5\" (1.74 m)   Wt 140.6 kg (310 lb)   SpO2 95%   BMI 46.45 kg/m²     Current Shift:  No intake/output data recorded. Last three shifts:  No intake/output data recorded. Labs: Results:       Chemistry Recent Labs     01/27/21 2030   *      K 4.1      CO2 33*   BUN 24*   CREA 0.72   CA 7.8*   AGAP 1*   BUCR 33*   AP 43*   TP 6.0*   ALB 3.4   GLOB 2.6   AGRAT 1.3      CBC w/Diff Recent Labs     01/29/21 0307 01/28/21 2020 01/28/21  1627   WBC 10.1 10.3 10.2   RBC 3.61* 3.52* 3.50*   HGB 10.8* 10.5* 10.5*   HCT 33.7* 32.5* 32.9*    138 132*   GRANS 79* 81* 79*   LYMPH 12* 13* 14*   EOS 2 1 1      Cardiac Enzymes Recent Labs     01/27/21 2030      CKND1 CALCULATION NOT PERFORMED WHEN RESULT IS BELOW LINEAR LIMIT      Coagulation Recent Labs     01/29/21 0307 01/28/21  0055   PTP 14.6 15.3*   INR 1.2 1.2       Lipid Panel No results found for: CHOL, CHOLPOCT, CHOLX, CHLST, CHOLV, 773512, HDL, HDLP, LDL, LDLC, DLDLP, 279788, VLDLC, VLDL, TGLX, TRIGL, TRIGP, TGLPOCT, CHHD, CHHDX   BNP No results for input(s): BNPP in the last 72 hours.    Liver Enzymes Recent Labs     01/27/21 2030   TP 6.0*   ALB 3.4   AP 43*      Thyroid Studies No results found for: T4, T3U, TSH, TSHEXT, TSHEXT     Procedures/imaging: see electronic medical records for all procedures/Xrays and details which were not copied into this note but were reviewed prior to creation of Plan

## 2021-01-29 NOTE — PROGRESS NOTES
Gastrointestinal Progress Note    Patient Name: Kailey Sarah    SWSVG'P Date: 1/29/2021    Admit Date: 1/25/2021    Subjective:     Diet: Patient is on Clear Liquid and is tolerating. Nausea is not present. Vomiting is not present. Pain: Patient does not complain of abdominal pain.     Bowel Movements: Normal    Bleeding:  None    Current Facility-Administered Medications   Medication Dose Route Frequency    dilTIAZem ER (CARDIZEM CD) capsule 120 mg  120 mg Oral DAILY    albumin human 25% (BUMINATE) solution 25 g  25 g IntraVENous Q6H    0.9% sodium chloride infusion 250 mL  250 mL IntraVENous PRN    insulin lispro (HUMALOG) injection   SubCUTAneous AC&HS    glucose chewable tablet 16 g  4 Tab Oral PRN    glucagon (GLUCAGEN) injection 1 mg  1 mg IntraMUSCular PRN    dextrose 10% infusion 125-250 mL  125-250 mL IntraVENous PRN    midazolam (PF) (VERSED) injection    PRN    fentaNYL citrate (PF) injection    PRN    0.9% sodium chloride infusion    CONTINUOUS    pantoprazole (PROTONIX) 40 mg in 0.9% sodium chloride 10 mL injection  40 mg IntraVENous Q12H    moxifloxacin (AVELOX) tablet 400 mg  400 mg Oral ACB    lactated Ringers infusion  125 mL/hr IntraVENous CONTINUOUS    ALPRAZolam (XANAX) tablet 0.5 mg  0.5 mg Oral QHS    carvediloL (COREG) tablet 25 mg  25 mg Oral BID WITH MEALS    potassium tablet 198 mg (Patient Supplied)  198 mg Oral DAILY    spironolactone (ALDACTONE) tablet 12.5 mg  12.5 mg Oral PCL    montelukast (SINGULAIR) tablet 10 mg  10 mg Oral PCL    thyroid (Pork) (ARMOUR) tablet 60 mg  60 mg Oral DAILY    sodium chloride (NS) flush 5-40 mL  5-40 mL IntraVENous Q8H    sodium chloride (NS) flush 5-40 mL  5-40 mL IntraVENous PRN    enoxaparin (LOVENOX) injection 30 mg  30 mg SubCUTAneous Q12H    HYDROcodone-acetaminophen (NORCO) 5-325 mg per tablet 1-2 Tab  1-2 Tab Oral Q4H PRN    HYDROmorphone (PF) (DILAUDID) injection 0.5 mg  0.5 mg IntraVENous Q4H PRN    naloxone [facility]) injection 0.4 mg  0.4 mg IntraVENous PRN    ondansetron (ZOFRAN) injection 4 mg  4 mg IntraVENous Q4H PRN    diphenhydrAMINE (BENADRYL) capsule 25 mg  25 mg Oral Q4H PRN          Objective:     Visit Vitals  BP (!) 145/81   Pulse 95   Temp 98.1 °F (36.7 °C)   Resp 24   Ht 5' 8.5\" (1.74 m)   Wt 140.6 kg (310 lb)   SpO2 90%   BMI 46.45 kg/m²       No intake/output data recorded. No exam performed today,     Data Review:    Labs: Results:       Chemistry Recent Labs     01/27/21 2030   *      K 4.1      CO2 33*   BUN 24*   CREA 0.72   CA 7.8*   AGAP 1*   BUCR 33*   AP 43*   TP 6.0*   ALB 3.4   GLOB 2.6   AGRAT 1.3      CBC w/Diff Recent Labs     01/29/21  1230 01/29/21  0850 01/29/21  0307   WBC 10.8 11.3 10.1   RBC 3.79* 3.92* 3.61*   HGB 11.0* 11.5* 10.8*   HCT 34.8* 36.2 33.7*    168 145   GRANS 70 80* 79*   LYMPH 11* 4* 12*   EOS 0 4 2      Coagulation Recent Labs     01/29/21  0307 01/28/21  0055   PTP 14.6 15.3*   INR 1.2 1.2       Liver Enzymes Recent Labs     01/27/21 2030   TP 6.0*   ALB 3.4   AP 43*          Assessment:     Active Problems:    Osteoarthritis of right knee (1/25/2021)      Atrial fibrillation ([address] (1/26/2021)      GI bleed (1/26/2021)      Hypotension (1/26/2021)    1. Mild wall thickening and paracolic stranding involving descending left colon  and proximal sigmoid colon, nonspecific but perhaps colitis, potentially  infectious, inflammatory, or ischemic etiology. 2. Nonobstructive left renal staghorn calculus. 3. Unusual linear metallic structure in the duodenum, nonspecific. Consider  metallic clip from recent endoscopic biopsy or unusual swallowed foreign body. Clinical correlation is necessary. 4. Mild amount of nonspecific pelvic free fluid. No fluid collection. 5. Small bilateral pleural effusions with mild bibasilar atelectasis versus  scarring. 6. Fat-containing umbilical and supraumbilical ventral small hernias.     Plan:     Acute gI bleeding 2 days post right knee replacement while on Lovenox for PE  appears to be caused by an complex extended proximal duodenal ulcer with visible vessel one metallic clip applied. No further bleeding. Not  sure about the cause. h Pylori serology is pending but Ig G is normal. It could be ischemic in nature. The problem we can't use contrast to evaluate the vessels. No obvious cancer according to the CT scan. Would do the colonoscopy on Monday to investigate the Mild wall thickening and paracolic stranding involving descending left colon and proximal sigmoid colon, nonspecific but perhaps colitis, potentially infectious, inflammatory, or ischemic etiology. Later would need a repeat EGD. For now do duplex of mesenteric vessels    Patient agreed to the colonoscopy.  Would keep her the weekend on full liquid diet          JULIA Barrios MD  January 29, 2021

## 2021-01-29 NOTE — PROGRESS NOTES
Progress Note     Patient: Dc Escobar MRN: [id number]  SSN: [id number]    YOB: 1950  Age: 69 y.o. Sex: female      POD:    2 Days Post-Op  S/P:    Procedure(s):  ESOPHAGOGASTRODUODENOSCOPY (EGD); CLIP PLACEMENT     Subjective:   Pt is with complaints of pain in the knee. (I walked into room while patient was performing transition to seated position in bed). Patient states she has been receiving one pain pill at a time which is no longer adequately controlling her pain. She states that she will be open to rehab placement upon discharge. Objective:     Patient Vitals for the past 12 hrs:   BP Temp Pulse Resp SpO2   01/29/21 1101 (!) 145/81 98.1 °F (36.7 °C) 95 24 90 %   01/29/21 0654 (!) 154/87 98.2 °F (36.8 °C) 85 24 95 %   01/29/21 0432 126/74 98.8 °F (37.1 °C) 87 24 93 %     Recent Labs     01/29/21  0850 01/29/21  0307 01/27/21 2030 01/27/21 2030   HGB 11.5* 10.8*   < > 11.2*   HCT 36.2 33.7*   < > 35.3   INR  --  1.2   < >  --    NA  --   --   --  141   K  --   --   --  4.1   CL  --   --   --  107   CO2  --   --   --  33*   BUN  --   --   --  24*   CREA  --   --   --  0.72   GLU  --   --   --  139*    < > = values in this interval not displayed. Pt. Seated on edge of bed. Lower extremity operative dressing intact with anterior blood shadowing. Neurovascularly intact. Assessment:     S/P R TKR         Plan:   1. Continue pain management/ ice to operative area. 2. Continue to progress with PT/OT. 3. Discharge planning to SNF once cleared from medical/GI standpoints. Will discuss anticoagulation recommendations for discharge with medicine.

## 2021-01-29 NOTE — PROGRESS NOTES
Bedside and Verbal shift change report given to CONCEPCIÓN Tillman RN (oncoming nurse) by RENE Rocha RN (offgoing nurse). Report included the following information SBAR, Kardex, Intake/Output, MAR and Recent Results.

## 2021-01-30 LAB
BASOPHILS # BLD: 0 K/UL (ref 0–0.1)
BASOPHILS # BLD: 0 K/UL (ref 0–0.1)
BASOPHILS NFR BLD: 0 % (ref 0–2)
BASOPHILS NFR BLD: 0 % (ref 0–2)
DIFFERENTIAL METHOD BLD: ABNORMAL
DIFFERENTIAL METHOD BLD: ABNORMAL
EOSINOPHIL # BLD: 0.1 K/UL (ref 0–0.4)
EOSINOPHIL # BLD: 0.1 K/UL (ref 0–0.4)
EOSINOPHIL NFR BLD: 1 % (ref 0–5)
EOSINOPHIL NFR BLD: 1 % (ref 0–5)
ERYTHROCYTE [DISTWIDTH] IN BLOOD BY AUTOMATED COUNT: 13.5 % (ref 11.6–14.5)
ERYTHROCYTE [DISTWIDTH] IN BLOOD BY AUTOMATED COUNT: 13.6 % (ref 11.6–14.5)
GLUCOSE BLD STRIP.AUTO-MCNC: 133 MG/DL (ref 70–110)
GLUCOSE BLD STRIP.AUTO-MCNC: 144 MG/DL (ref 70–110)
GLUCOSE BLD STRIP.AUTO-MCNC: 148 MG/DL (ref 70–110)
GLUCOSE BLD STRIP.AUTO-MCNC: 172 MG/DL (ref 70–110)
GLUCOSE BLD STRIP.AUTO-MCNC: 196 MG/DL (ref 70–110)
HCT VFR BLD AUTO: 34.7 % (ref 35–45)
HCT VFR BLD AUTO: 36 % (ref 35–45)
HGB BLD-MCNC: 11.5 G/DL (ref 12–16)
HGB BLD-MCNC: 11.5 G/DL (ref 12–16)
INR PPP: 1.2 (ref 0.8–1.2)
LYMPHOCYTES # BLD: 1.5 K/UL (ref 0.9–3.6)
LYMPHOCYTES # BLD: 1.6 K/UL (ref 0.9–3.6)
LYMPHOCYTES NFR BLD: 16 % (ref 21–52)
LYMPHOCYTES NFR BLD: 16 % (ref 21–52)
MCH RBC QN AUTO: 29.2 PG (ref 24–34)
MCH RBC QN AUTO: 30.3 PG (ref 24–34)
MCHC RBC AUTO-ENTMCNC: 31.9 G/DL (ref 31–37)
MCHC RBC AUTO-ENTMCNC: 33.1 G/DL (ref 31–37)
MCV RBC AUTO: 91.3 FL (ref 74–97)
MCV RBC AUTO: 91.4 FL (ref 74–97)
MONOCYTES # BLD: 0.8 K/UL (ref 0.05–1.2)
MONOCYTES # BLD: 1 K/UL (ref 0.05–1.2)
MONOCYTES NFR BLD: 10 % (ref 3–10)
MONOCYTES NFR BLD: 9 % (ref 3–10)
NEUTS SEG # BLD: 7.1 K/UL (ref 1.8–8)
NEUTS SEG # BLD: 7.6 K/UL (ref 1.8–8)
NEUTS SEG NFR BLD: 73 % (ref 40–73)
NEUTS SEG NFR BLD: 74 % (ref 40–73)
PLATELET # BLD AUTO: 185 K/UL (ref 135–420)
PLATELET # BLD AUTO: 190 K/UL (ref 135–420)
PMV BLD AUTO: 10.7 FL (ref 9.2–11.8)
PMV BLD AUTO: 10.8 FL (ref 9.2–11.8)
PROTHROMBIN TIME: 15.2 SEC (ref 11.5–15.2)
RBC # BLD AUTO: 3.8 M/UL (ref 4.2–5.3)
RBC # BLD AUTO: 3.94 M/UL (ref 4.2–5.3)
WBC # BLD AUTO: 10.3 K/UL (ref 4.6–13.2)
WBC # BLD AUTO: 9.6 K/UL (ref 4.6–13.2)

## 2021-01-30 PROCEDURE — 77010033678 HC OXYGEN DAILY

## 2021-01-30 PROCEDURE — 74011250636 HC RX REV CODE- 250/636: Performed by: PHYSICIAN ASSISTANT

## 2021-01-30 PROCEDURE — P9047 ALBUMIN (HUMAN), 25%, 50ML: HCPCS | Performed by: FAMILY MEDICINE

## 2021-01-30 PROCEDURE — 85610 PROTHROMBIN TIME: CPT

## 2021-01-30 PROCEDURE — 74011250637 HC RX REV CODE- 250/637: Performed by: PHYSICIAN ASSISTANT

## 2021-01-30 PROCEDURE — C9113 INJ PANTOPRAZOLE SODIUM, VIA: HCPCS | Performed by: FAMILY MEDICINE

## 2021-01-30 PROCEDURE — 85025 COMPLETE CBC W/AUTO DIFF WBC: CPT

## 2021-01-30 PROCEDURE — 74011000250 HC RX REV CODE- 250: Performed by: FAMILY MEDICINE

## 2021-01-30 PROCEDURE — 82962 GLUCOSE BLOOD TEST: CPT

## 2021-01-30 PROCEDURE — 97110 THERAPEUTIC EXERCISES: CPT

## 2021-01-30 PROCEDURE — 36415 COLL VENOUS BLD VENIPUNCTURE: CPT

## 2021-01-30 PROCEDURE — 65660000000 HC RM CCU STEPDOWN

## 2021-01-30 PROCEDURE — 97530 THERAPEUTIC ACTIVITIES: CPT

## 2021-01-30 PROCEDURE — 74011250636 HC RX REV CODE- 250/636: Performed by: FAMILY MEDICINE

## 2021-01-30 PROCEDURE — 74011250637 HC RX REV CODE- 250/637: Performed by: FAMILY MEDICINE

## 2021-01-30 PROCEDURE — 74011636637 HC RX REV CODE- 636/637: Performed by: FAMILY MEDICINE

## 2021-01-30 RX ORDER — POLYETHYLENE GLYCOL 3350, SODIUM SULFATE, SODIUM CHLORIDE, POTASSIUM CHLORIDE, SODIUM ASCORBATE, AND ASCORBIC ACID 7.5-2.691G
2 KIT ORAL 2 TIMES DAILY
Status: ACTIVE | OUTPATIENT
Start: 2021-01-31 | End: 2021-02-01

## 2021-01-30 RX ORDER — POLYETHYLENE GLYCOL 3350, SODIUM SULFATE, SODIUM CHLORIDE, POTASSIUM CHLORIDE, SODIUM ASCORBATE, AND ASCORBIC ACID 7.5-2.691G
2 KIT ORAL 2 TIMES DAILY
Status: DISCONTINUED | OUTPATIENT
Start: 2021-01-30 | End: 2021-01-30

## 2021-01-30 RX ADMIN — MOXIFLOXACIN 400 MG: 400 TABLET, FILM COATED ORAL at 06:24

## 2021-01-30 RX ADMIN — DILTIAZEM HYDROCHLORIDE 120 MG: 120 CAPSULE, COATED, EXTENDED RELEASE ORAL at 08:58

## 2021-01-30 RX ADMIN — INSULIN LISPRO 2 UNITS: 100 INJECTION, SOLUTION INTRAVENOUS; SUBCUTANEOUS at 16:30

## 2021-01-30 RX ADMIN — SPIRONOLACTONE 12.5 MG: 25 TABLET ORAL at 13:44

## 2021-01-30 RX ADMIN — PANTOPRAZOLE SODIUM 40 MG: 40 INJECTION, POWDER, FOR SOLUTION INTRAVENOUS at 08:31

## 2021-01-30 RX ADMIN — HYDROCODONE BITARTRATE AND ACETAMINOPHEN 1 TABLET: 5; 325 TABLET ORAL at 21:59

## 2021-01-30 RX ADMIN — CARVEDILOL 25 MG: 12.5 TABLET, FILM COATED ORAL at 18:02

## 2021-01-30 RX ADMIN — CARVEDILOL 25 MG: 12.5 TABLET, FILM COATED ORAL at 08:31

## 2021-01-30 RX ADMIN — ALPRAZOLAM 0.5 MG: 0.5 TABLET ORAL at 21:59

## 2021-01-30 RX ADMIN — ENOXAPARIN SODIUM 30 MG: 30 INJECTION SUBCUTANEOUS at 06:23

## 2021-01-30 RX ADMIN — HYDROMORPHONE HYDROCHLORIDE 0.5 MG: 1 INJECTION, SOLUTION INTRAMUSCULAR; INTRAVENOUS; SUBCUTANEOUS at 11:47

## 2021-01-30 RX ADMIN — PANTOPRAZOLE SODIUM 40 MG: 40 INJECTION, POWDER, FOR SOLUTION INTRAVENOUS at 21:59

## 2021-01-30 RX ADMIN — ENOXAPARIN SODIUM 30 MG: 30 INJECTION SUBCUTANEOUS at 18:03

## 2021-01-30 RX ADMIN — ALBUMIN (HUMAN) 25 G: 0.25 INJECTION, SOLUTION INTRAVENOUS at 09:00

## 2021-01-30 RX ADMIN — ALBUMIN (HUMAN) 25 G: 0.25 INJECTION, SOLUTION INTRAVENOUS at 02:48

## 2021-01-30 RX ADMIN — MONTELUKAST 10 MG: 10 TABLET, FILM COATED ORAL at 13:00

## 2021-01-30 RX ADMIN — INSULIN LISPRO 2 UNITS: 100 INJECTION, SOLUTION INTRAVENOUS; SUBCUTANEOUS at 11:30

## 2021-01-30 RX ADMIN — HYDROCODONE BITARTRATE AND ACETAMINOPHEN 2 TABLET: 5; 325 TABLET ORAL at 02:52

## 2021-01-30 RX ADMIN — ALBUMIN (HUMAN) 25 G: 0.25 INJECTION, SOLUTION INTRAVENOUS at 18:03

## 2021-01-30 RX ADMIN — LEVOTHYROXINE, LIOTHYRONINE 60 MG: 19; 4.5 TABLET ORAL at 09:00

## 2021-01-30 RX ADMIN — ALBUMIN (HUMAN) 25 G: 0.25 INJECTION, SOLUTION INTRAVENOUS at 21:49

## 2021-01-30 NOTE — ROUTINE PROCESS
Bedside shift change report given to Thelma CRANE RN (oncoming nurse) by BLAZE Patel RN (offgoing nurse). Report included the following information SBAR, Kardex, Intake/Output and MAR.

## 2021-01-30 NOTE — PROGRESS NOTES
0730: Verbal shift change report given to [address] (oncoming nurse) by [facility] RN (offgoing nurse). Report included the following information SBAR and Kardex.       80: MD Nils Osgood at the bedside to assess pt, MD discuss colonoscopy procedure and provided education to pt, continue to monitor pt for any change  SANJANA Mckeon RN    5040: pt request diet change, MD Nils Osgood gives diet order for soft bland diet  Mulugeta Marcum RN

## 2021-01-30 NOTE — PROGRESS NOTES
Problem: Falls - Risk of  Goal: *Absence of Falls  Description: Document Bob Coleman Fall Risk and appropriate interventions in the flowsheet.   Outcome: Progressing Towards Goal  Note: Fall Risk Interventions:  Mobility Interventions: Bed/chair exit alarm, Communicate number of staff needed for ambulation/transfer, Patient to call before getting OOB, Utilize walker, cane, or other assistive device    Mentation Interventions: Adequate sleep, hydration, pain control    Medication Interventions: Bed/chair exit alarm, Patient to call before getting OOB, Teach patient to arise slowly    Elimination Interventions: Bed/chair exit alarm, Elevated toilet seat, Patient to call for help with toileting needs, Toileting schedule/hourly rounds, Toilet paper/wipes in reach    History of Falls Interventions: Bed/chair exit alarm, Door open when patient unattended, Investigate reason for fall         Problem: Patient Education: Go to Patient Education Activity  Goal: Patient/Family Education  Outcome: Progressing Towards Goal     Problem: Knee Replacement: Day of Surgery/Unit  Goal: Activity/Safety  Outcome: Progressing Towards Goal  Goal: Consults, if ordered  Outcome: Progressing Towards Goal  Goal: Diagnostic Test/Procedures  Outcome: Progressing Towards Goal  Goal: Nutrition/Diet  Outcome: Progressing Towards Goal  Goal: Medications  Outcome: Progressing Towards Goal  Goal: Respiratory  Outcome: Progressing Towards Goal  Goal: Treatments/Interventions/Procedures  Outcome: Progressing Towards Goal  Goal: Psychosocial  Outcome: Progressing Towards Goal  Goal: *Initiate mobility  Outcome: Progressing Towards Goal  Goal: *Optimal pain control at patient's stated goal  Outcome: Progressing Towards Goal  Goal: *Hemodynamically stable  Outcome: Progressing Towards Goal     Problem: Knee Replacement: Post-Op Day 1  Goal: Activity/Safety  Outcome: Progressing Towards Goal  Goal: Diagnostic Test/Procedures  Outcome: Progressing Towards Goal  Goal: Nutrition/Diet  Outcome: Progressing Towards Goal  Goal: Medications  Outcome: Progressing Towards Goal  Goal: Respiratory  Outcome: Progressing Towards Goal  Goal: Treatments/Interventions/Procedures  Outcome: Progressing Towards Goal  Goal: Psychosocial  Outcome: Progressing Towards Goal  Goal: Discharge Planning  Outcome: Progressing Towards Goal  Goal: *Demonstrates progressive activity  Outcome: Progressing Towards Goal  Goal: *Optimal pain control at patient's stated goal  Outcome: Progressing Towards Goal  Goal: *Hemodynamically stable  Outcome: Progressing Towards Goal  Goal: *Discharge plan identified  Outcome: Progressing Towards Goal     Problem: Patient Education: Go to Patient Education Activity  Goal: Patient/Family Education  Outcome: Progressing Towards Goal     Problem: Patient Education: Go to Patient Education Activity  Goal: Patient/Family Education  Outcome: Progressing Towards Goal     Problem: Patient Education: Go to Patient Education Activity  Goal: Patient/Family Education  Outcome: Progressing Towards Goal     Problem: Pressure Injury - Risk of  Goal: *Prevention of pressure injury  Description: Document Christophe Scale and appropriate interventions in the flowsheet.   Outcome: Progressing Towards Goal  Note: Pressure Injury Interventions:  Sensory Interventions: Assess changes in LOC, Float heels, Keep linens dry and wrinkle-free, Monitor skin under medical devices, Pressure redistribution bed/mattress (bed type)    Moisture Interventions: Absorbent underpads, Check for incontinence Q2 hours and as needed, Internal/External urinary devices, Minimize layers, Moisture barrier    Activity Interventions: Increase time out of bed, Pressure redistribution bed/mattress(bed type), PT/OT evaluation    Mobility Interventions: HOB 30 degrees or less, Pressure redistribution bed/mattress (bed type), PT/OT evaluation    Nutrition Interventions: Document food/fluid/supplement intake, Offer support with meals,snacks and hydration    Friction and Shear Interventions: Apply protective barrier, creams and emollients, Foam dressings/transparent film/skin sealants, HOB 30 degrees or less                Problem: Patient Education: Go to Patient Education Activity  Goal: Patient/Family Education  Outcome: Progressing Towards Goal

## 2021-01-30 NOTE — PROGRESS NOTES
Hospitalist Progress Note    Patient: Rebeka Cortes MRN: [id number]  CSN: [id number]    YOB: 1950  Age: 69 y.o. Sex: female    DOA: 1/25/2021 LOS:  LOS: 3 days            Patient Active Problem List   Diagnosis Code    Osteoarthritis of right knee M17.11    Atrial fibrillation (HCC) I48.91    GI bleed K92.2    Hypotension I95.9        IMPRESSION and Plan:    Rebeka Cortes is a 69 y.o. female with   Patient Active Problem List    Diagnosis Date Noted    Atrial fibrillation ([address] 01/26/2021    GI bleed 01/26/2021    Hypotension 01/26/2021    Osteoarthritis of right knee 01/25/2021     Active Problems:    Osteoarthritis of right knee (1/25/2021)      Atrial fibrillation ([address] (1/26/2021)      GI bleed (1/26/2021)      Hypotension (1/26/2021)      70 y/o female with chronic atrial fibrillation, CHF, HTN, and morbid obesity status post right knee total arthroplasty, medicine consulted for gastrointestinal bleeding and hypotension.            HTN - bp/hr stable          GI bleed-  S/p EGD with findings of There are multiple prepyloric linear long erosions and ulcerations with congested erythematous mucosa. Confluent superficial hemorrhagic ulcerations in the distal bulb covered with flat clots and extending into the distal duodenum up to D3. In the distal bulb where there is a large visible vessel with sentinel clot not actively bleeding or oozing. One clip is applied. There is a mass effect compressing the proximal part of the D2 suggestive of pancreatic neoplasm? ? But without visible mucosal invasion. Guillermo Hilt awiating GI input. For possible c-scope on Monday     CT abdomen and pelvis with no pancreatic mass.    Left staghorn calculus     H/H stable     Ok to start on DVT prophylaxis with lovenox per GI     Atrial fibrillation - HR stable        AMY -  CPAP at night.     Follow up with urology for staghorn calculi        Osteoarthritis of R knee  S/p right TKA  -- per ortho         Patient's condition is fair    Will need SNF      Recommend to continue hospitalization.  Discussed with patient.    Chief Complaints: No chief complaint on file.    SUBJECTIVE:  Pt is seen and examined.   chart reviewed. She is somewhat anxiosn \"still debating about c-scope\"      Review of systems:    Review of Systems   Constitutional: Positive for malaise/fatigue.   HENT: Negative.    Eyes: Negative.    Respiratory: Positive for shortness of breath.    Cardiovascular: Positive for leg swelling. Negative for chest pain, palpitations and orthopnea.   Gastrointestinal: Negative.  Negative for abdominal pain, diarrhea and heartburn.   Genitourinary: Negative for dysuria and hematuria.   Skin: Negative.    Neurological: Positive for weakness.   Psychiatric/Behavioral: Negative for depression, substance abuse and suicidal ideas. The patient is not nervous/anxious.        PE:  Patient Vitals for the past 24 hrs:   BP Temp Pulse Resp SpO2   01/30/21 0621 (!) 153/81 98.4 °F (36.9 °C) 97 22 92 %   01/29/21 2147 (!) 152/58 98.6 °F (37 °C) 85 22 98 %   01/29/21 1500 (!) 149/77 98.7 °F (37.1 °C) 99 24 97 %   01/29/21 1101 (!) 145/81 98.1 °F (36.7 °C) 95 24 90 %       Intake/Output Summary (Last 24 hours) at 1/30/2021 1043  Last data filed at 1/29/2021 1803  Gross per 24 hour   Intake —   Output 900 ml   Net -900 ml     Patient Vitals for the past 120 hrs:   Weight   01/25/21 1156 140.6 kg (310 lb)         Physical Exam  Vitals signs and nursing note reviewed.   Constitutional:       General: She is in acute distress.      Appearance: She is ill-appearing.   Neck:      Musculoskeletal: Normal range of motion and neck supple.      Vascular: No JVD.   Cardiovascular:      Rate and Rhythm: Normal rate and regular rhythm.      Heart sounds: Normal heart sounds.   Pulmonary:      Effort: No respiratory distress.      Breath sounds: Normal breath sounds.   Abdominal:      General: Bowel sounds are normal. There is no distension.      Palpations: Abdomen  is soft. Tenderness: There is no abdominal tenderness. There is no rebound. Musculoskeletal: Normal range of motion. Skin:     General: Skin is warm and dry. Neurological:      Mental Status: She is alert and oriented to person, place, and time. Motor: Weakness present. Psychiatric:         Mood and Affect: Affect normal.         Thought Content: Thought content normal.         Judgment: Judgment normal.             Intake and Output:  Current Shift:  No intake/output data recorded. Last three shifts:  01/28 1901 - 01/30 0700  In: -   Out: 1800 [Urine:1800]    Lab/Data Reviewed:  Recent Results (from the past 8 hour(s))   GLUCOSE, POC    Collection Time: 01/30/21  6:22 AM   Result Value Ref Range    Glucose (POC) 148 (H) 70 - 110 mg/dL   CBC WITH AUTOMATED DIFF    Collection Time: 01/30/21  8:10 AM   Result Value Ref Range    WBC 10.3 4.6 - 13.2 K/uL    RBC 3.80 (L) 4.20 - 5.30 M/uL    HGB 11.5 (L) 12.0 - 16.0 g/dL    HCT 34.7 (L) 35.0 - 45.0 %    MCV 91.3 74.0 - 97.0 FL    MCH 30.3 24.0 - 34.0 PG    MCHC 33.1 31.0 - 37.0 g/dL    RDW 13.6 11.6 - 14.5 %    PLATELET 722 099 - 969 K/uL    MPV 10.7 9.2 - 11.8 FL    NEUTROPHILS 73 40 - 73 %    LYMPHOCYTES 16 (L) 21 - 52 %    MONOCYTES 10 3 - 10 %    EOSINOPHILS 1 0 - 5 %    BASOPHILS 0 0 - 2 %    ABS. NEUTROPHILS 7.6 1.8 - 8.0 K/UL    ABS. LYMPHOCYTES 1.6 0.9 - 3.6 K/UL    ABS. MONOCYTES 1.0 0.05 - 1.2 K/UL    ABS. EOSINOPHILS 0.1 0.0 - 0.4 K/UL    ABS.  BASOPHILS 0.0 0.0 - 0.1 K/UL    DF AUTOMATED       Medications:  Current Facility-Administered Medications   Medication Dose Route Frequency    metoclopramide HCl (REGLAN) injection 5 mg  5 mg IntraVENous Q6H PRN    dilTIAZem ER (CARDIZEM CD) capsule 120 mg  120 mg Oral DAILY    albumin human 25% (BUMINATE) solution 25 g  25 g IntraVENous Q6H    0.9% sodium chloride infusion 250 mL  250 mL IntraVENous PRN    insulin lispro (HUMALOG) injection   SubCUTAneous AC&HS    glucose chewable tablet 16 g  4 Tab Oral PRN    glucagon (GLUCAGEN) injection 1 mg  1 mg IntraMUSCular PRN    dextrose 10% infusion 125-250 mL  125-250 mL IntraVENous PRN    midazolam (PF) (VERSED) injection    PRN    fentaNYL citrate (PF) injection    PRN    0.9% sodium chloride infusion    CONTINUOUS    pantoprazole (PROTONIX) 40 mg in 0.9% sodium chloride 10 mL injection  40 mg IntraVENous Q12H    moxifloxacin (AVELOX) tablet 400 mg  400 mg Oral ACB    lactated Ringers infusion  125 mL/hr IntraVENous CONTINUOUS    ALPRAZolam (XANAX) tablet 0.5 mg  0.5 mg Oral QHS    carvediloL (COREG) tablet 25 mg  25 mg Oral BID WITH MEALS    potassium tablet 198 mg (Patient Supplied)  198 mg Oral DAILY    spironolactone (ALDACTONE) tablet 12.5 mg  12.5 mg Oral PCL    montelukast (SINGULAIR) tablet 10 mg  10 mg Oral PCL    thyroid (Pork) (ARMOUR) tablet 60 mg  60 mg Oral DAILY    sodium chloride (NS) flush 5-40 mL  5-40 mL IntraVENous PRN    enoxaparin (LOVENOX) injection 30 mg  30 mg SubCUTAneous Q12H    HYDROcodone-acetaminophen (NORCO) 5-325 mg per tablet 1-2 Tab  1-2 Tab Oral Q4H PRN    HYDROmorphone (PF) (DILAUDID) injection 0.5 mg  0.5 mg IntraVENous Q4H PRN    naloxone (NARCAN) injection 0.4 mg  0.4 mg IntraVENous PRN    ondansetron (ZOFRAN) injection 4 mg  4 mg IntraVENous Q4H PRN    diphenhydrAMINE (BENADRYL) capsule 25 mg  25 mg Oral Q4H PRN       Recent Results (from the past 24 hour(s))   GLUCOSE, POC    Collection Time: 01/29/21 11:05 AM   Result Value Ref Range    Glucose (POC) 134 (H) 70 - 110 mg/dL   CBC WITH AUTOMATED DIFF    Collection Time: 01/29/21 12:30 PM   Result Value Ref Range    WBC 10.8 4.6 - 13.2 K/uL    RBC 3.79 (L) 4.20 - 5.30 M/uL    HGB 11.0 (L) 12.0 - 16.0 g/dL    HCT 34.8 (L) 35.0 - 45.0 %    MCV 91.8 74.0 - 97.0 FL    MCH 29.0 24.0 - 34.0 PG    MCHC 31.6 31.0 - 37.0 g/dL    RDW 13.7 11.6 - 14.5 %    PLATELET 313 650 - 787 K/uL    MPV 11.8 9.2 - 11.8 FL    NEUTROPHILS 70 42 - 75 %    BAND NEUTROPHILS 11 (H) 0 - 5 %    LYMPHOCYTES 11 (L) 20 - 51 %    MONOCYTES 8 2 - 9 %    EOSINOPHILS 0 0 - 5 %    BASOPHILS 0 0 - 3 %    NRBC 3.0 (H) 0  WBC    ABS. NEUTROPHILS 7.6 1.8 - 8.0 K/UL    ABS. LYMPHOCYTES 1.2 0.8 - 3.5 K/UL    ABS. MONOCYTES 0.9 0 - 1.0 K/UL    ABS. EOSINOPHILS 0.0 0.0 - 0.4 K/UL    ABS. BASOPHILS 0.0 0.0 - 0.1 K/UL    RBC COMMENTS POLYCHROMASIA  1+        RBC COMMENTS OVALOCYTES  1+        DF MANUAL     SARS-COV-2    Collection Time: 01/29/21 12:44 PM   Result Value Ref Range    SARS-CoV-2 Please find results under separate order     COVID-19 RAPID TEST    Collection Time: 01/29/21 12:44 PM   Result Value Ref Range    Specimen source Nasopharyngeal      COVID-19 rapid test Not detected NOTD     CBC WITH AUTOMATED DIFF    Collection Time: 01/29/21  4:10 PM   Result Value Ref Range    WBC 10.7 4.6 - 13.2 K/uL    RBC 3.75 (L) 4.20 - 5.30 M/uL    HGB 10.6 (L) 12.0 - 16.0 g/dL    HCT 34.3 (L) 35.0 - 45.0 %    MCV 91.5 74.0 - 97.0 FL    MCH 28.3 24.0 - 34.0 PG    MCHC 30.9 (L) 31.0 - 37.0 g/dL    RDW 13.6 11.6 - 14.5 %    PLATELET 789 422 - 707 K/uL    MPV 11.2 9.2 - 11.8 FL    NEUTROPHILS 79 (H) 40 - 73 %    LYMPHOCYTES 12 (L) 21 - 52 %    MONOCYTES 8 3 - 10 %    EOSINOPHILS 1 0 - 5 %    BASOPHILS 0 0 - 2 %    ABS. NEUTROPHILS 8.4 (H) 1.8 - 8.0 K/UL    ABS. LYMPHOCYTES 1.3 0.9 - 3.6 K/UL    ABS. MONOCYTES 0.9 0.05 - 1.2 K/UL    ABS. EOSINOPHILS 0.1 0.0 - 0.4 K/UL    ABS.  BASOPHILS 0.0 0.0 - 0.1 K/UL    PLATELET COMMENTS LARGE PLATELETS      RBC COMMENTS ANISOCYTOSIS  1+        RBC COMMENTS POLYCHROMASIA  1+        DF AUTOMATED     GLUCOSE, POC    Collection Time: 01/29/21  4:48 PM   Result Value Ref Range    Glucose (POC) 161 (H) 70 - 110 mg/dL   CBC WITH AUTOMATED DIFF    Collection Time: 01/29/21  7:50 PM   Result Value Ref Range    WBC 10.6 4.6 - 13.2 K/uL    RBC 3.85 (L) 4.20 - 5.30 M/uL    HGB 11.2 (L) 12.0 - 16.0 g/dL    HCT 35.1 35.0 - 45.0 %    MCV 91.2 74.0 - 97.0 FL    MCH 29.1 24.0 - 34.0 PG MCHC 31.9 31.0 - 37.0 g/dL    RDW 13.6 11.6 - 14.5 %    PLATELET 476 276 - 789 K/uL    MPV 11.0 9.2 - 11.8 FL    NEUTROPHILS 77 (H) 40 - 73 %    LYMPHOCYTES 14 (L) 21 - 52 %    MONOCYTES 8 3 - 10 %    EOSINOPHILS 1 0 - 5 %    BASOPHILS 0 0 - 2 %    ABS. NEUTROPHILS 8.1 (H) 1.8 - 8.0 K/UL    ABS. LYMPHOCYTES 1.5 0.9 - 3.6 K/UL    ABS. MONOCYTES 0.9 0.05 - 1.2 K/UL    ABS. EOSINOPHILS 0.1 0.0 - 0.4 K/UL    ABS. BASOPHILS 0.0 0.0 - 0.1 K/UL    DF AUTOMATED     GLUCOSE, POC    Collection Time: 01/29/21  9:43 PM   Result Value Ref Range    Glucose (POC) 148 (H) 70 - 110 mg/dL   PROTHROMBIN TIME + INR    Collection Time: 01/30/21  1:33 AM   Result Value Ref Range    Prothrombin time 15.2 11.5 - 15.2 sec    INR 1.2 0.8 - 1.2     CBC WITH AUTOMATED DIFF    Collection Time: 01/30/21  1:33 AM   Result Value Ref Range    WBC 9.6 4.6 - 13.2 K/uL    RBC 3.94 (L) 4.20 - 5.30 M/uL    HGB 11.5 (L) 12.0 - 16.0 g/dL    HCT 36.0 35.0 - 45.0 %    MCV 91.4 74.0 - 97.0 FL    MCH 29.2 24.0 - 34.0 PG    MCHC 31.9 31.0 - 37.0 g/dL    RDW 13.5 11.6 - 14.5 %    PLATELET 026 672 - 690 K/uL    MPV 10.8 9.2 - 11.8 FL    NEUTROPHILS 74 (H) 40 - 73 %    LYMPHOCYTES 16 (L) 21 - 52 %    MONOCYTES 9 3 - 10 %    EOSINOPHILS 1 0 - 5 %    BASOPHILS 0 0 - 2 %    ABS. NEUTROPHILS 7.1 1.8 - 8.0 K/UL    ABS. LYMPHOCYTES 1.5 0.9 - 3.6 K/UL    ABS. MONOCYTES 0.8 0.05 - 1.2 K/UL    ABS. EOSINOPHILS 0.1 0.0 - 0.4 K/UL    ABS.  BASOPHILS 0.0 0.0 - 0.1 K/UL    DF AUTOMATED     GLUCOSE, POC    Collection Time: 01/30/21  2:43 AM   Result Value Ref Range    Glucose (POC) 133 (H) 70 - 110 mg/dL   GLUCOSE, POC    Collection Time: 01/30/21  6:22 AM   Result Value Ref Range    Glucose (POC) 148 (H) 70 - 110 mg/dL   CBC WITH AUTOMATED DIFF    Collection Time: 01/30/21  8:10 AM   Result Value Ref Range    WBC 10.3 4.6 - 13.2 K/uL    RBC 3.80 (L) 4.20 - 5.30 M/uL    HGB 11.5 (L) 12.0 - 16.0 g/dL    HCT 34.7 (L) 35.0 - 45.0 %    MCV 91.3 74.0 - 97.0 FL    MCH 30.3 24.0 - 34.0 PG    MCHC 33.1 31.0 - 37.0 g/dL    RDW 13.6 11.6 - 14.5 %    PLATELET 213 965 - 534 K/uL    MPV 10.7 9.2 - 11.8 FL    NEUTROPHILS 73 40 - 73 %    LYMPHOCYTES 16 (L) 21 - 52 %    MONOCYTES 10 3 - 10 %    EOSINOPHILS 1 0 - 5 %    BASOPHILS 0 0 - 2 %    ABS. NEUTROPHILS 7.6 1.8 - 8.0 K/UL    ABS. LYMPHOCYTES 1.6 0.9 - 3.6 K/UL    ABS. MONOCYTES 1.0 0.05 - 1.2 K/UL    ABS. EOSINOPHILS 0.1 0.0 - 0.4 K/UL    ABS.  BASOPHILS 0.0 0.0 - 0.1 K/UL    DF AUTOMATED         Procedures/imaging: see electronic medical records for all procedures/Xrays and details which were not copied into this note but were reviewed prior to creation of Sheila Enriquez MD   1/30/2021, 10:43 AM

## 2021-01-30 NOTE — PROGRESS NOTES
Progress Note     Patient: Rickie Haines MRN: [id number]  SSN: [id number]    YOB: 1950  Age: 69 y.o. Sex: female      POD:    3 Days Post-Op  S/P:    Procedure(s):  ESOPHAGOGASTRODUODENOSCOPY (EGD); CLIP PLACEMENT     Subjective:   Pt is with complaints of pain but notes that the discomfort is improved from yesterday. She reports walking \"a little bit\" with PT but not much. She denies performing flexion exercises of the knee. She maintains that she would like to avoid SNF if at all possible but understands that if she is not safe to go home she will go to rehab. Objective:     Patient Vitals for the past 12 hrs:   BP Temp Pulse Resp SpO2   01/30/21 0621 (!) 153/81 98.4 °F (36.9 °C) 97 22 92 %   01/29/21 2147 (!) 152/58 98.6 °F (37 °C) 85 22 98 %     Recent Labs     01/30/21  0810 01/30/21  0133 01/27/21 2030 01/27/21 2030   HGB 11.5* 11.5*   < > 11.2*   HCT 34.7* 36.0   < > 35.3   INR  --  1.2   < >  --    NA  --   --   --  141   K  --   --   --  4.1   CL  --   --   --  107   CO2  --   --   --  33*   BUN  --   --   --  24*   CREA  --   --   --  0.72   GLU  --   --   --  139*    < > = values in this interval not displayed. Pt. resting in bed. Lower extremity operative dressing intact with blood shadowing. Neurovascularly intact. Assessment:     S/P R TKR       Plan:   1. Continue pain management/ ice to operative area. 2. Continue to progress with PT/OT. Patient encouraged to perform flexion/extension exercises when she is seated in order to keep her knee from getting too stiff. 3. Discharge planning likely to SNF once cleared by GI/medicine team.  4. DVT prophylaxis with Lovenox as per medicine.

## 2021-01-30 NOTE — PROGRESS NOTES
Problem: Mobility Impaired (Adult and Pediatric)  Goal: *Acute Goals and Plan of Care (Insert Text)  Description: In 1-5 days pt will be able to perform:  ST.  Bed mobility:  Rolling L to R to L modified independent for positioning. 2.  Supine to sit to supine CGA with HR for meals. 3.  Sit to stand to sit CGA with RW in prep for ambulation. LT.  Gait:  Ambulate >50ft CGA with RW, WBAT, for home/community mobility. 2.  Stair Negotiation:  Ascend/descend >1 step CGA with HR for home entry. 3.  Activity tolerance: Tolerate up in chair 1-2 hours for ADL's.  4.  Patient/Family Education:  Patient/family to be independent with HEP for follow-up care and safe discharge. Outcome: Progressing Towards Goal   []  Patient has met MD mobilization critieria for d/c home   []  Recommend HH with 24 hour adult care   [x]  Benefit from additional acute PT session to address:  SNF placement    PHYSICAL THERAPY TREATMENT    Patient: Rebeka Cortes (60 y.o. female)  Date: 2021  Diagnosis: Osteoarthritis of right knee [M17.11] <principal problem not specified>  Procedure(s) (LRB):  ESOPHAGOGASTRODUODENOSCOPY (EGD); CLIP PLACEMENT (N/A) 3 Days Post-Op  Precautions: Fall, WBAT(R TKA)  PLOF:     ASSESSMENT:  Pt pain is better controlled today. A/AAROM performed on the RLE in bed. Pt able to sit up EOB with less assistance and less pain this session. Pt left sitting EOB, notified nurse. Progression toward goals:   []      Improving appropriately and progressing toward goals  [x]      Improving slowly and progressing toward goals  []      Not making progress toward goals and plan of care will be adjusted     PLAN:  Patient continues to benefit from skilled intervention to address the above impairments. Continue treatment per established plan of care.   Discharge Recommendations:  Ciaran Ibanez  Further Equipment Recommendations for Discharge:  wide base rolling walker     SUBJECTIVE:   Patient stated How can I sit up?     OBJECTIVE DATA SUMMARY:   Critical Behavior:  Neurologic State: Alert  Orientation Level: Appropriate for age  Cognition: Appropriate decision making, Appropriate for age attention/concentration, Appropriate safety awareness, Follows commands  Safety/Judgement: Decreased awareness of need for assistance, Decreased awareness of need for safety, Lack of insight into deficits  Functional Mobility Training:  Bed Mobility:    Supine to Sit: Additional time; Moderate assistance    Scooting: Maximum assistance  Balance:  Sitting: Intact  Therapeutic Exercises:   RLE      EXERCISE   Sets   Reps   Active Active Assist   Passive Self ROM   Comments   Ankle Pumps  10  [x] [] [] []    Quad Sets/Glut Sets  10ea  [x] [] [] [] Hold for 5 secs   Hamstring Sets   [] [] [] []    Short Arc Quads   [] [] [] []    Heel Slides  10 [] [x] [] []    Straight Leg Raises   [] [] [] []    Hip Add  10 [] [x] [] [] Hold for 5 secs, w/ pillow squeeze   Long Arc Quads   [] [] [] []    Seated Marching   [] [] [] []    Standing Marching   [] [] [] []       [] [] [] []        Pain:  Pain level pre-treatment: 0/10  Pain level post-treatment: 0/10   Pain Intervention(s): Medication (see MAR); Rest, Ice, Repositioning   Response to intervention: Nurse notified, See doc flow    Activity Tolerance:   Fair-/poor+  Please refer to the flowsheet for vital signs taken during this treatment. After treatment:   [] Patient left in no apparent distress sitting up in chair  [x] Patient left in no apparent distress in bed  [x] Call bell left within reach  [x] Nursing notified  [] Caregiver present  [] Bed alarm activated  [] SCDs applied      COMMUNICATION/EDUCATION:   [x]         Role of Physical Therapy in the acute care setting. [x]         Fall prevention education was provided and the patient/caregiver indicated understanding. [x]         Patient/family have participated as able in working toward goals and plan of care.   [x] Patient/family agree to work toward stated goals and plan of care. []         Patient understands intent and goals of therapy, but is neutral about his/her participation.   []         Patient is unable to participate in stated goals/plan of care: ongoing with therapy staff.  []         Other:        Tammi Mccray, PTA   Time Calculation: 24 mins

## 2021-01-30 NOTE — ROUTINE PROCESS
Assume care of patient from FRANCIS JAMES RN. Patient received in bed awake. Patient A&Ox4, denies pain and discomfort. No distress noted. Bed locked in low position. Call bell within reach and patient verbalized understanding of use for assistance and needs. 1955- Patient repositioned and made comfortable in bed. 2052- Patient repositioned and made comfortable in bed.  
 
0725- Bedside and Verbal shift change report given to Emilie Mcclendon RN (oncoming nurse) by Gt Guy RN (offgoing nurse). Report included the following information SBAR, Kardex, Intake/Output, MAR and Recent Results. [facility] Night Shift Chart Audit Chart Audit completed?  YES

## 2021-01-30 NOTE — PROGRESS NOTES
Problem: Falls - Risk of  Goal: *Absence of Falls  Description: Document Jet Sol Fall Risk and appropriate interventions in the flowsheet.   Outcome: Progressing Towards Goal  Note: Fall Risk Interventions:  Mobility Interventions: Bed/chair exit alarm, Communicate number of staff needed for ambulation/transfer, Mechanical lift, OT consult for ADLs, Patient to call before getting OOB, PT Consult for mobility concerns, PT Consult for assist device competence, Strengthening exercises (ROM-active/passive)    Mentation Interventions: Bed/chair exit alarm, Adequate sleep, hydration, pain control, Door open when patient unattended, Evaluate medications/consider consulting pharmacy, Eyeglasses and hearing aids, Familiar objects from home, Increase mobility, More frequent rounding, Toileting rounds, Update white board    Medication Interventions: Bed/chair exit alarm, Evaluate medications/consider consulting pharmacy, Patient to call before getting OOB, Teach patient to arise slowly    Elimination Interventions: Bed/chair exit alarm, Call light in reach, Elevated toilet seat, Patient to call for help with toileting needs, Stay With Me (per policy), Toilet paper/wipes in reach, Toileting schedule/hourly rounds    History of Falls Interventions: Bed/chair exit alarm, Consult care management for discharge planning, Door open when patient unattended, Evaluate medications/consider consulting pharmacy, Utilize gait belt for transfer/ambulation, Assess for delayed presentation/identification of injury for 48 hrs (comment for end date), Vital signs minimum Q4HRs X 24 hrs (comment for end date)         Problem: Patient Education: Go to Patient Education Activity  Goal: Patient/Family Education  Outcome: Progressing Towards Goal     Problem: Knee Replacement: Day of Surgery/Unit  Goal: Activity/Safety  Outcome: Progressing Towards Goal  Goal: Consults, if ordered  Outcome: Progressing Towards Goal  Goal: Diagnostic Test/Procedures  Outcome: Progressing Towards Goal  Goal: Nutrition/Diet  Outcome: Progressing Towards Goal  Goal: Medications  Outcome: Progressing Towards Goal  Goal: Respiratory  Outcome: Progressing Towards Goal  Goal: Treatments/Interventions/Procedures  Outcome: Progressing Towards Goal  Goal: Psychosocial  Outcome: Progressing Towards Goal  Goal: *Initiate mobility  Outcome: Progressing Towards Goal  Goal: *Optimal pain control at patient's stated goal  Outcome: Progressing Towards Goal  Goal: *Hemodynamically stable  Outcome: Progressing Towards Goal     Problem: Knee Replacement: Post-Op Day 1  Goal: Activity/Safety  Outcome: Progressing Towards Goal  Goal: Diagnostic Test/Procedures  Outcome: Progressing Towards Goal  Goal: Nutrition/Diet  Outcome: Progressing Towards Goal  Goal: Medications  Outcome: Progressing Towards Goal  Goal: Respiratory  Outcome: Progressing Towards Goal  Goal: Treatments/Interventions/Procedures  Outcome: Progressing Towards Goal  Goal: Psychosocial  Outcome: Progressing Towards Goal  Goal: Discharge Planning  Outcome: Progressing Towards Goal  Goal: *Demonstrates progressive activity  Outcome: Progressing Towards Goal  Goal: *Optimal pain control at patient's stated goal  Outcome: Progressing Towards Goal  Goal: *Hemodynamically stable  Outcome: Progressing Towards Goal  Goal: *Discharge plan identified  Outcome: Progressing Towards Goal     Problem: Pain  Goal: *Control of Pain  Outcome: Progressing Towards Goal     Problem: Patient Education: Go to Patient Education Activity  Goal: Patient/Family Education  Outcome: Progressing Towards Goal     Problem: Patient Education: Go to Patient Education Activity  Goal: Patient/Family Education  Outcome: Progressing Towards Goal     Problem: Patient Education: Go to Patient Education Activity  Goal: Patient/Family Education  Outcome: Progressing Towards Goal     Problem: Pressure Injury - Risk of  Goal: *Prevention of pressure injury  Description: Document Christophe Scale and appropriate interventions in the flowsheet.   Outcome: Progressing Towards Goal     Problem: Patient Education: Go to Patient Education Activity  Goal: Patient/Family Education  Outcome: Progressing Towards Goal

## 2021-01-31 LAB
ALBUMIN SERPL-MCNC: 4.4 G/DL (ref 3.4–5)
ALBUMIN/GLOB SERPL: 2 {RATIO} (ref 0.8–1.7)
ALP SERPL-CCNC: 45 U/L (ref 45–117)
ALT SERPL-CCNC: 27 U/L (ref 13–56)
ANION GAP SERPL CALC-SCNC: 5 MMOL/L (ref 3–18)
AST SERPL-CCNC: 18 U/L (ref 10–38)
BASOPHILS # BLD: 0 K/UL (ref 0–0.1)
BASOPHILS # BLD: 0 K/UL (ref 0–0.1)
BASOPHILS NFR BLD: 0 % (ref 0–2)
BASOPHILS NFR BLD: 0 % (ref 0–3)
BILIRUB SERPL-MCNC: 2.3 MG/DL (ref 0.2–1)
BUN SERPL-MCNC: 21 MG/DL (ref 7–18)
BUN/CREAT SERPL: 28 (ref 12–20)
CALCIUM SERPL-MCNC: 8.6 MG/DL (ref 8.5–10.1)
CHLORIDE SERPL-SCNC: 101 MMOL/L (ref 100–111)
CO2 SERPL-SCNC: 31 MMOL/L (ref 21–32)
CREAT SERPL-MCNC: 0.76 MG/DL (ref 0.6–1.3)
DIFFERENTIAL METHOD BLD: ABNORMAL
DIFFERENTIAL METHOD BLD: ABNORMAL
EOSINOPHIL # BLD: 0 K/UL (ref 0–0.4)
EOSINOPHIL # BLD: 0.1 K/UL (ref 0–0.4)
EOSINOPHIL NFR BLD: 0 % (ref 0–5)
EOSINOPHIL NFR BLD: 1 % (ref 0–5)
ERYTHROCYTE [DISTWIDTH] IN BLOOD BY AUTOMATED COUNT: 13.6 % (ref 11.6–14.5)
ERYTHROCYTE [DISTWIDTH] IN BLOOD BY AUTOMATED COUNT: 13.7 % (ref 11.6–14.5)
GLOBULIN SER CALC-MCNC: 2.2 G/DL (ref 2–4)
GLUCOSE BLD STRIP.AUTO-MCNC: 134 MG/DL (ref 70–110)
GLUCOSE BLD STRIP.AUTO-MCNC: 139 MG/DL (ref 70–110)
GLUCOSE BLD STRIP.AUTO-MCNC: 193 MG/DL (ref 70–110)
GLUCOSE BLD STRIP.AUTO-MCNC: 206 MG/DL (ref 70–110)
GLUCOSE SERPL-MCNC: 186 MG/DL (ref 74–99)
HCT VFR BLD AUTO: 33.1 % (ref 35–45)
HCT VFR BLD AUTO: 33.2 % (ref 35–45)
HGB BLD-MCNC: 10.2 G/DL (ref 12–16)
HGB BLD-MCNC: 11.1 G/DL (ref 12–16)
INR PPP: 1.2 (ref 0.8–1.2)
LYMPHOCYTES # BLD: 1.6 K/UL (ref 0.8–3.5)
LYMPHOCYTES # BLD: 1.8 K/UL (ref 0.9–3.6)
LYMPHOCYTES NFR BLD: 14 % (ref 20–51)
LYMPHOCYTES NFR BLD: 17 % (ref 21–52)
MAGNESIUM SERPL-MCNC: 2.6 MG/DL (ref 1.6–2.6)
MCH RBC QN AUTO: 28.3 PG (ref 24–34)
MCH RBC QN AUTO: 30.2 PG (ref 24–34)
MCHC RBC AUTO-ENTMCNC: 30.8 G/DL (ref 31–37)
MCHC RBC AUTO-ENTMCNC: 33.4 G/DL (ref 31–37)
MCV RBC AUTO: 90.5 FL (ref 74–97)
MCV RBC AUTO: 91.7 FL (ref 74–97)
MONOCYTES # BLD: 0.9 K/UL (ref 0.05–1.2)
MONOCYTES # BLD: 0.9 K/UL (ref 0–1)
MONOCYTES NFR BLD: 8 % (ref 2–9)
MONOCYTES NFR BLD: 8 % (ref 3–10)
NEUTS BAND NFR BLD MANUAL: 18 % (ref 0–5)
NEUTS SEG # BLD: 6.9 K/UL (ref 1.8–8)
NEUTS SEG # BLD: 8.1 K/UL (ref 1.8–8)
NEUTS SEG NFR BLD: 60 % (ref 42–75)
NEUTS SEG NFR BLD: 74 % (ref 40–73)
NRBC BLD-RTO: 2 PER 100 WBC
PHOSPHATE SERPL-MCNC: 2.9 MG/DL (ref 2.5–4.9)
PLATELET # BLD AUTO: 202 K/UL (ref 135–420)
PLATELET # BLD AUTO: 209 K/UL (ref 135–420)
PMV BLD AUTO: 10.7 FL (ref 9.2–11.8)
PMV BLD AUTO: 11.1 FL (ref 9.2–11.8)
POTASSIUM SERPL-SCNC: 3.8 MMOL/L (ref 3.5–5.5)
PROT SERPL-MCNC: 6.6 G/DL (ref 6.4–8.2)
PROTHROMBIN TIME: 15.2 SEC (ref 11.5–15.2)
RBC # BLD AUTO: 3.61 M/UL (ref 4.2–5.3)
RBC # BLD AUTO: 3.67 M/UL (ref 4.2–5.3)
RBC MORPH BLD: ABNORMAL
SODIUM SERPL-SCNC: 137 MMOL/L (ref 136–145)
WBC # BLD AUTO: 10.9 K/UL (ref 4.6–13.2)
WBC # BLD AUTO: 11.6 K/UL (ref 4.6–13.2)

## 2021-01-31 PROCEDURE — 80053 COMPREHEN METABOLIC PANEL: CPT

## 2021-01-31 PROCEDURE — 74011250637 HC RX REV CODE- 250/637: Performed by: FAMILY MEDICINE

## 2021-01-31 PROCEDURE — 74011250636 HC RX REV CODE- 250/636: Performed by: FAMILY MEDICINE

## 2021-01-31 PROCEDURE — 36415 COLL VENOUS BLD VENIPUNCTURE: CPT

## 2021-01-31 PROCEDURE — 65660000000 HC RM CCU STEPDOWN

## 2021-01-31 PROCEDURE — 77010033678 HC OXYGEN DAILY

## 2021-01-31 PROCEDURE — 82962 GLUCOSE BLOOD TEST: CPT

## 2021-01-31 PROCEDURE — C9113 INJ PANTOPRAZOLE SODIUM, VIA: HCPCS | Performed by: FAMILY MEDICINE

## 2021-01-31 PROCEDURE — 84100 ASSAY OF PHOSPHORUS: CPT

## 2021-01-31 PROCEDURE — 83735 ASSAY OF MAGNESIUM: CPT

## 2021-01-31 PROCEDURE — 74011250637 HC RX REV CODE- 250/637: Performed by: PHYSICIAN ASSISTANT

## 2021-01-31 PROCEDURE — 74011250636 HC RX REV CODE- 250/636: Performed by: PHYSICIAN ASSISTANT

## 2021-01-31 PROCEDURE — 85610 PROTHROMBIN TIME: CPT

## 2021-01-31 PROCEDURE — 85025 COMPLETE CBC W/AUTO DIFF WBC: CPT

## 2021-01-31 PROCEDURE — 74011636637 HC RX REV CODE- 636/637: Performed by: FAMILY MEDICINE

## 2021-01-31 PROCEDURE — 74011000250 HC RX REV CODE- 250: Performed by: FAMILY MEDICINE

## 2021-01-31 PROCEDURE — P9047 ALBUMIN (HUMAN), 25%, 50ML: HCPCS | Performed by: FAMILY MEDICINE

## 2021-01-31 RX ADMIN — ALBUMIN (HUMAN) 25 G: 0.25 INJECTION, SOLUTION INTRAVENOUS at 08:48

## 2021-01-31 RX ADMIN — PANTOPRAZOLE SODIUM 40 MG: 40 INJECTION, POWDER, FOR SOLUTION INTRAVENOUS at 08:47

## 2021-01-31 RX ADMIN — HYDROCODONE BITARTRATE AND ACETAMINOPHEN 2 TABLET: 5; 325 TABLET ORAL at 23:50

## 2021-01-31 RX ADMIN — CARVEDILOL 25 MG: 12.5 TABLET, FILM COATED ORAL at 08:47

## 2021-01-31 RX ADMIN — MOXIFLOXACIN 400 MG: 400 TABLET, FILM COATED ORAL at 06:52

## 2021-01-31 RX ADMIN — SPIRONOLACTONE 12.5 MG: 25 TABLET ORAL at 15:51

## 2021-01-31 RX ADMIN — ENOXAPARIN SODIUM 30 MG: 30 INJECTION SUBCUTANEOUS at 17:31

## 2021-01-31 RX ADMIN — ALBUMIN (HUMAN) 25 G: 0.25 INJECTION, SOLUTION INTRAVENOUS at 15:51

## 2021-01-31 RX ADMIN — HYDROCODONE BITARTRATE AND ACETAMINOPHEN 2 TABLET: 5; 325 TABLET ORAL at 15:51

## 2021-01-31 RX ADMIN — HYDROCODONE BITARTRATE AND ACETAMINOPHEN 2 TABLET: 5; 325 TABLET ORAL at 02:01

## 2021-01-31 RX ADMIN — DILTIAZEM HYDROCHLORIDE 120 MG: 120 CAPSULE, COATED, EXTENDED RELEASE ORAL at 08:47

## 2021-01-31 RX ADMIN — CARVEDILOL 25 MG: 12.5 TABLET, FILM COATED ORAL at 17:31

## 2021-01-31 RX ADMIN — PANTOPRAZOLE SODIUM 40 MG: 40 INJECTION, POWDER, FOR SOLUTION INTRAVENOUS at 21:48

## 2021-01-31 RX ADMIN — ENOXAPARIN SODIUM 30 MG: 30 INJECTION SUBCUTANEOUS at 06:52

## 2021-01-31 RX ADMIN — DIPHENHYDRAMINE HYDROCHLORIDE 25 MG: 25 CAPSULE ORAL at 02:01

## 2021-01-31 RX ADMIN — ALBUMIN (HUMAN) 25 G: 0.25 INJECTION, SOLUTION INTRAVENOUS at 02:03

## 2021-01-31 RX ADMIN — INSULIN LISPRO 2 UNITS: 100 INJECTION, SOLUTION INTRAVENOUS; SUBCUTANEOUS at 17:31

## 2021-01-31 RX ADMIN — MONTELUKAST 10 MG: 10 TABLET, FILM COATED ORAL at 12:03

## 2021-01-31 RX ADMIN — HYDROCODONE BITARTRATE AND ACETAMINOPHEN 1 TABLET: 5; 325 TABLET ORAL at 10:22

## 2021-01-31 RX ADMIN — ALPRAZOLAM 0.5 MG: 0.5 TABLET ORAL at 21:48

## 2021-01-31 RX ADMIN — INSULIN LISPRO 4 UNITS: 100 INJECTION, SOLUTION INTRAVENOUS; SUBCUTANEOUS at 22:10

## 2021-01-31 RX ADMIN — LEVOTHYROXINE, LIOTHYRONINE 60 MG: 19; 4.5 TABLET ORAL at 08:47

## 2021-01-31 RX ADMIN — ALBUMIN (HUMAN) 25 G: 0.25 INJECTION, SOLUTION INTRAVENOUS at 21:48

## 2021-01-31 RX ADMIN — DIPHENHYDRAMINE HYDROCHLORIDE 25 MG: 25 CAPSULE ORAL at 23:50

## 2021-01-31 NOTE — PROGRESS NOTES
Hospitalist Progress Note    Patient: Mine Berry MRN: [id number]  CSN: [id number]    YOB: 1950  Age: 69 y.o. Sex: female    DOA: 1/25/2021 LOS:  LOS: 4 days            Patient Active Problem List   Diagnosis Code    Osteoarthritis of right knee M17.11    Atrial fibrillation (HCC) I48.91    GI bleed K92.2    Hypotension I95.9        IMPRESSION and Plan:    Mine Berry is a 69 y.o. female with   Patient Active Problem List    Diagnosis Date Noted    Atrial fibrillation ([address] 01/26/2021    GI bleed 01/26/2021    Hypotension 01/26/2021    Osteoarthritis of right knee 01/25/2021     Active Problems:    Osteoarthritis of right knee (1/25/2021)      Atrial fibrillation ([address] (1/26/2021)      GI bleed (1/26/2021)      Hypotension (1/26/2021)      68 y/o female with chronic atrial fibrillation, CHF, HTN, and morbid obesity status post right knee total arthroplasty, medicine consulted for gastrointestinal bleeding and hypotension.            HTN - bp/hr stable          GI bleed-  S/p EGD with findings of There are multiple prepyloric linear long erosions and ulcerations with congested erythematous mucosa. Confluent superficial hemorrhagic ulcerations in the distal bulb covered with flat clots and extending into the distal duodenum up to D3. In the distal bulb where there is a large visible vessel with sentinel clot not actively bleeding or oozing. One clip is applied. There is a mass effect compressing the proximal part of the D2 suggestive of pancreatic neoplasm? ? But without visible mucosal invasion. Dustin Dupont awiating GI input. For possible c-scope on Monday     CT abdomen and pelvis with no pancreatic mass.    Left staghorn calculus     H/H stable     Ok to start on DVT prophylaxis with lovenox per GI     Atrial fibrillation - HR stable        AMY -  CPAP at night.     Follow up with urology for staghorn calculi        Osteoarthritis of R knee  S/p right TKA  -- per ortho         Patient's condition is fair    Will need SNF      Recommend to continue hospitalization. Discussed with patient. Chief Complaints: No chief complaint on file. SUBJECTIVE:  Pt is seen and examined. Still deciding about c -scope       Review of systems:    Review of Systems   Constitutional: Positive for malaise/fatigue. HENT: Negative. Eyes: Negative. Respiratory: Positive for shortness of breath. Cardiovascular: Positive for leg swelling. Negative for chest pain, palpitations and orthopnea. Gastrointestinal: Negative. Negative for abdominal pain, diarrhea and heartburn. Genitourinary: Negative for dysuria and hematuria. Skin: Negative. Neurological: Positive for weakness. Psychiatric/Behavioral: Negative for depression, substance abuse and suicidal ideas. The patient is not nervous/anxious. PE:  Patient Vitals for the past 24 hrs:   BP Temp Pulse Resp SpO2   01/31/21 1203 119/61 98 °F (36.7 °C) 78 20 94 %   01/31/21 0843 (!) 139/92 97.9 °F (36.6 °C) 87 20 95 %   01/31/21 0350 (!) 144/89 98 °F (36.7 °C) 77 22 100 %   01/30/21 2244 120/66 98.2 °F (36.8 °C) 65 16    01/30/21 1802 (!) 149/85  90     01/30/21 1300 (!) 151/89 97.6 °F (36.4 °C) 85 20 94 %       Intake/Output Summary (Last 24 hours) at 1/31/2021 1245  Last data filed at 1/31/2021 0843  Gross per 24 hour   Intake 440 ml   Output 400 ml   Net 40 ml     No data found. Physical Exam  Vitals signs and nursing note reviewed. Constitutional:       General: She is in acute distress. Appearance: She is ill-appearing. Neck:      Musculoskeletal: Normal range of motion and neck supple. Vascular: No JVD. Cardiovascular:      Rate and Rhythm: Normal rate and regular rhythm. Heart sounds: Normal heart sounds. Pulmonary:      Effort: No respiratory distress. Breath sounds: Normal breath sounds. Abdominal:      General: Bowel sounds are normal. There is no distension. Palpations: Abdomen is soft. Tenderness: There is no abdominal tenderness. There is no rebound. Musculoskeletal: Normal range of motion. Skin:     General: Skin is warm and dry. Neurological:      Mental Status: She is alert and oriented to person, place, and time. Motor: Weakness present. Psychiatric:         Mood and Affect: Affect normal.         Thought Content: Thought content normal.         Judgment: Judgment normal.             Intake and Output:  Current Shift:  01/31 0701 - 01/31 1900  In: 200 [P.O.:200]  Out: 400 [Urine:400]  Last three shifts:  01/29 1901 - 01/31 0700  In: 820 [P.O.:720; I.V.:100]  Out: -     Lab/Data Reviewed:  Recent Results (from the past 8 hour(s))   GLUCOSE, POC    Collection Time: 01/31/21  6:50 AM   Result Value Ref Range    Glucose (POC) 134 (H) 70 - 110 mg/dL   CBC WITH AUTOMATED DIFF    Collection Time: 01/31/21  8:28 AM   Result Value Ref Range    WBC 10.9 4.6 - 13.2 K/uL    RBC 3.61 (L) 4.20 - 5.30 M/uL    HGB 10.2 (L) 12.0 - 16.0 g/dL    HCT 33.1 (L) 35.0 - 45.0 %    MCV 91.7 74.0 - 97.0 FL    MCH 28.3 24.0 - 34.0 PG    MCHC 30.8 (L) 31.0 - 37.0 g/dL    RDW 13.7 11.6 - 14.5 %    PLATELET 714 249 - 810 K/uL    MPV 10.7 9.2 - 11.8 FL    NEUTROPHILS 74 (H) 40 - 73 %    LYMPHOCYTES 17 (L) 21 - 52 %    MONOCYTES 8 3 - 10 %    EOSINOPHILS 1 0 - 5 %    BASOPHILS 0 0 - 2 %    ABS. NEUTROPHILS 8.1 (H) 1.8 - 8.0 K/UL    ABS. LYMPHOCYTES 1.8 0.9 - 3.6 K/UL    ABS. MONOCYTES 0.9 0.05 - 1.2 K/UL    ABS. EOSINOPHILS 0.1 0.0 - 0.4 K/UL    ABS.  BASOPHILS 0.0 0.0 - 0.1 K/UL    DF AUTOMATED     GLUCOSE, POC    Collection Time: 01/31/21 11:59 AM   Result Value Ref Range    Glucose (POC) 139 (H) 70 - 110 mg/dL     Medications:  Current Facility-Administered Medications   Medication Dose Route Frequency    peg 3350-Electrolytes-Vit C (MOVIPREP) oral solution 2 L  2 L Oral BID    metoclopramide HCl (REGLAN) injection 5 mg  5 mg IntraVENous Q6H PRN    dilTIAZem ER (CARDIZEM CD) capsule 120 mg  120 mg Oral DAILY    albumin human 25% (BUMINATE) solution 25 g  25 g IntraVENous Q6H    0.9% sodium chloride infusion 250 mL  250 mL IntraVENous PRN    insulin lispro (HUMALOG) injection   SubCUTAneous AC&HS    glucose chewable tablet 16 g  4 Tab Oral PRN    glucagon (GLUCAGEN) injection 1 mg  1 mg IntraMUSCular PRN    dextrose 10% infusion 125-250 mL  125-250 mL IntraVENous PRN    midazolam (PF) (VERSED) injection    PRN    fentaNYL citrate (PF) injection    PRN    0.9% sodium chloride infusion    CONTINUOUS    pantoprazole (PROTONIX) 40 mg in 0.9% sodium chloride 10 mL injection  40 mg IntraVENous Q12H    moxifloxacin (AVELOX) tablet 400 mg  400 mg Oral ACB    lactated Ringers infusion  125 mL/hr IntraVENous CONTINUOUS    ALPRAZolam (XANAX) tablet 0.5 mg  0.5 mg Oral QHS    carvediloL (COREG) tablet 25 mg  25 mg Oral BID WITH MEALS    potassium tablet 198 mg (Patient Supplied)  198 mg Oral DAILY    spironolactone (ALDACTONE) tablet 12.5 mg  12.5 mg Oral PCL    montelukast (SINGULAIR) tablet 10 mg  10 mg Oral PCL    thyroid (Pork) (ARMOUR) tablet 60 mg  60 mg Oral DAILY    sodium chloride (NS) flush 5-40 mL  5-40 mL IntraVENous PRN    enoxaparin (LOVENOX) injection 30 mg  30 mg SubCUTAneous Q12H    HYDROcodone-acetaminophen (NORCO) 5-325 mg per tablet 1-2 Tab  1-2 Tab Oral Q4H PRN    HYDROmorphone (PF) (DILAUDID) injection 0.5 mg  0.5 mg IntraVENous Q4H PRN    naloxone (NARCAN) injection 0.4 mg  0.4 mg IntraVENous PRN    ondansetron (ZOFRAN) injection 4 mg  4 mg IntraVENous Q4H PRN    diphenhydrAMINE (BENADRYL) capsule 25 mg  25 mg Oral Q4H PRN       Recent Results (from the past 24 hour(s))   GLUCOSE, POC    Collection Time: 01/30/21  1:35 PM   Result Value Ref Range    Glucose (POC) 196 (H) 70 - 110 mg/dL   GLUCOSE, POC    Collection Time: 01/30/21  6:01 PM   Result Value Ref Range    Glucose (POC) 172 (H) 70 - 110 mg/dL   GLUCOSE, POC    Collection Time: 01/30/21  9:35 PM   Result Value Ref Range Glucose (POC) 144 (H) 70 - 110 mg/dL   PROTHROMBIN TIME + INR    Collection Time: 01/31/21  1:20 AM   Result Value Ref Range    Prothrombin time 15.2 11.5 - 15.2 sec    INR 1.2 0.8 - 1.2     CBC WITH AUTOMATED DIFF    Collection Time: 01/31/21  1:20 AM   Result Value Ref Range    WBC 11.6 4.6 - 13.2 K/uL    RBC 3.67 (L) 4.20 - 5.30 M/uL    HGB 11.1 (L) 12.0 - 16.0 g/dL    HCT 33.2 (L) 35.0 - 45.0 %    MCV 90.5 74.0 - 97.0 FL    MCH 30.2 24.0 - 34.0 PG    MCHC 33.4 31.0 - 37.0 g/dL    RDW 13.6 11.6 - 14.5 %    PLATELET 847 536 - 277 K/uL    MPV 11.1 9.2 - 11.8 FL    NEUTROPHILS 60 42 - 75 %    BAND NEUTROPHILS 18 (H) 0 - 5 %    LYMPHOCYTES 14 (L) 20 - 51 %    MONOCYTES 8 2 - 9 %    EOSINOPHILS 0 0 - 5 %    BASOPHILS 0 0 - 3 %    NRBC 2.0 (H) 0  WBC    ABS. NEUTROPHILS 6.9 1.8 - 8.0 K/UL    ABS. LYMPHOCYTES 1.6 0.8 - 3.5 K/UL    ABS. MONOCYTES 0.9 0 - 1.0 K/UL    ABS. EOSINOPHILS 0.0 0.0 - 0.4 K/UL    ABS. BASOPHILS 0.0 0.0 - 0.1 K/UL    RBC COMMENTS ANISOCYTOSIS  SLIGHT  POLYCHROMASIA  1+        DF MANUAL     MAGNESIUM    Collection Time: 01/31/21  1:20 AM   Result Value Ref Range    Magnesium 2.6 1.6 - 2.6 mg/dL   METABOLIC PANEL, COMPREHENSIVE    Collection Time: 01/31/21  1:20 AM   Result Value Ref Range    Sodium 137 136 - 145 mmol/L    Potassium 3.8 3.5 - 5.5 mmol/L    Chloride 101 100 - 111 mmol/L    CO2 31 21 - 32 mmol/L    Anion gap 5 3.0 - 18 mmol/L    Glucose 186 (H) 74 - 99 mg/dL    BUN 21 (H) 7.0 - 18 MG/DL    Creatinine 0.76 0.6 - 1.3 MG/DL    BUN/Creatinine ratio 28 (H) 12 - 20      GFR est AA >60 >60 ml/min/1.73m2    GFR est non-AA >60 >60 ml/min/1.73m2    Calcium 8.6 8.5 - 10.1 MG/DL    Bilirubin, total 2.3 (H) 0.2 - 1.0 MG/DL    ALT (SGPT) 27 13 - 56 U/L    AST (SGOT) 18 10 - 38 U/L    Alk.  phosphatase 45 45 - 117 U/L    Protein, total 6.6 6.4 - 8.2 g/dL    Albumin 4.4 3.4 - 5.0 g/dL    Globulin 2.2 2.0 - 4.0 g/dL    A-G Ratio 2.0 (H) 0.8 - 1.7     PHOSPHORUS    Collection Time: 01/31/21 1:20 AM   Result Value Ref Range    Phosphorus 2.9 2.5 - 4.9 MG/DL   GLUCOSE, POC    Collection Time: 01/31/21  6:50 AM   Result Value Ref Range    Glucose (POC) 134 (H) 70 - 110 mg/dL   CBC WITH AUTOMATED DIFF    Collection Time: 01/31/21  8:28 AM   Result Value Ref Range    WBC 10.9 4.6 - 13.2 K/uL    RBC 3.61 (L) 4.20 - 5.30 M/uL    HGB 10.2 (L) 12.0 - 16.0 g/dL    HCT 33.1 (L) 35.0 - 45.0 %    MCV 91.7 74.0 - 97.0 FL    MCH 28.3 24.0 - 34.0 PG    MCHC 30.8 (L) 31.0 - 37.0 g/dL    RDW 13.7 11.6 - 14.5 %    PLATELET 972 617 - 549 K/uL    MPV 10.7 9.2 - 11.8 FL    NEUTROPHILS 74 (H) 40 - 73 %    LYMPHOCYTES 17 (L) 21 - 52 %    MONOCYTES 8 3 - 10 %    EOSINOPHILS 1 0 - 5 %    BASOPHILS 0 0 - 2 %    ABS. NEUTROPHILS 8.1 (H) 1.8 - 8.0 K/UL    ABS. LYMPHOCYTES 1.8 0.9 - 3.6 K/UL    ABS. MONOCYTES 0.9 0.05 - 1.2 K/UL    ABS. EOSINOPHILS 0.1 0.0 - 0.4 K/UL    ABS.  BASOPHILS 0.0 0.0 - 0.1 K/UL    DF AUTOMATED     GLUCOSE, POC    Collection Time: 01/31/21 11:59 AM   Result Value Ref Range    Glucose (POC) 139 (H) 70 - 110 mg/dL       Procedures/imaging: see electronic medical records for all procedures/Xrays and details which were not copied into this note but were reviewed prior to creation of Jeananne Hashimoto, MD   1/31/2021, 10:43 AM

## 2021-01-31 NOTE — PROGRESS NOTES
"Matt Sarmiento presented for Medicare AW today. The following components were reviewed and updated:    · Medical history  · Family History  · Social history  · Allergies and Current Medications  · Health Risk Assessment  · Health Maintenance  · Care Team    **See Completed Assessments for Annual Wellness visit with in the encounter summary    The following assessments were completed:  · Depression Screening  · Cognitive function Screening  Unable to perform clock test.  · Timed Get Up Test  · Whisper Test    Vitals:    07/02/20 0930   Pulse: 87   Temp: 97.3 °F (36.3 °C)   TempSrc: Temporal   SpO2: 98%   Weight: 63.5 kg (140 lb)   Height: 5' 6" (1.676 m)     Body mass index is 22.6 kg/m².   ]    Physical Exam  Vitals signs and nursing note reviewed.   Constitutional:       Appearance: Normal appearance.      Comments: Very thin, decreased muscle mass   HENT:      Head: Normocephalic and atraumatic.   Neck:      Musculoskeletal: Normal range of motion and neck supple.   Cardiovascular:      Rate and Rhythm: Normal rate and regular rhythm.      Pulses: Normal pulses.      Heart sounds: Normal heart sounds.   Pulmonary:      Effort: Pulmonary effort is normal.      Breath sounds: Normal breath sounds.   Musculoskeletal: Normal range of motion.      Comments: Left AKA stump intact     Skin:     General: Skin is warm and dry.      Comments: Sacral decubitus ulceration stage 3  Right heel very soft and potential for pressure ulcer   Neurological:      Mental Status: He is alert. He is disoriented.      Comments: Nonverbal. reluctant to cooperate with assessment   Psychiatric:      Comments: Resistant to care            No outpatient medications have been marked as taking for the 7/2/20 encounter (Office Visit) with IRLANDA Javed.    Patient is prescribed medications but his wife states she does not give him his medications.     Diagnoses and health risks identified today and associated " Problem: Falls - Risk of  Goal: *Absence of Falls  Description: Document Bard Tubbs Fall Risk and appropriate interventions in the flowsheet. Outcome: Progressing Towards Goal  Note: Fall Risk Interventions:  Mobility Interventions: Communicate number of staff needed for ambulation/transfer, Patient to call before getting OOB, Utilize walker, cane, or other assistive device    Mentation Interventions: Bed/chair exit alarm, Adequate sleep, hydration, pain control, Door open when patient unattended, Evaluate medications/consider consulting pharmacy, Eyeglasses and hearing aids, Familiar objects from home, Increase mobility, More frequent rounding, Toileting rounds, Update white board    Medication Interventions: Teach patient to arise slowly, Patient to call before getting OOB    Elimination Interventions: Call light in reach, Patient to call for help with toileting needs, Toileting schedule/hourly rounds    History of Falls Interventions: Investigate reason for fall, Consult care management for discharge planning         Problem: Pain  Goal: *Control of Pain  Outcome: Progressing Towards Goal     Problem: Patient Education: Go to Patient Education Activity  Goal: Patient/Family Education  Outcome: Progressing Towards Goal     Problem: Patient Education: Go to Patient Education Activity  Goal: Patient/Family Education  Outcome: Progressing Towards Goal     Problem: Pressure Injury - Risk of  Goal: *Prevention of pressure injury  Description: Document Christophe Scale and appropriate interventions in the flowsheet.   Outcome: Progressing Towards Goal  Note: Pressure Injury Interventions:  Sensory Interventions: Assess changes in LOC, Assess need for specialty bed, Avoid rigorous massage over bony prominences, Chair cushion, Check visual cues for pain, Discuss PT/OT consult with provider, Float heels, Keep linens dry and wrinkle-free, Maintain/enhance activity level, Minimize linen layers, Monitor skin under medical devices, Pad between skin to skin, Pressure redistribution bed/mattress (bed type), Turn and reposition approx.  every two hours (pillows and wedges if needed)    Moisture Interventions: Apply protective barrier, creams and emollients, Limit adult briefs    Activity Interventions: Pressure redistribution bed/mattress(bed type), Increase time out of bed    Mobility Interventions: Pressure redistribution bed/mattress (bed type), HOB 30 degrees or less    Nutrition Interventions: Document food/fluid/supplement intake, Offer support with meals,snacks and hydration    Friction and Shear Interventions: HOB 30 degrees or less, Apply protective barrier, creams and emollients recommendations/orders:  1. Encounter for preventive health examination  Completed. Health maintenance: Aspirin/antiplatelet therapy, PSA, and lipid panel overdue. Labs ordered with Home health. Will defer aspirin/antiplatelet therapy to PCP.     3. Essential hypertension  Chronic. Unable to obtain BP during the assessment as the patient was not able to cooperate. No acute issues. The patient is prescribed antihypertensives but wife states she does not give him his medications. Home health nurse ordered. Follow up with PCP.    4. Dementia in Alzheimer's disease  Chronic and advanced. No acute safety issues identified at this time. Someone is with patient 24/7. Not on any medication for Alzheimer's disease. Will defer to PCP. Consider neurology referral if warranted. Ordered home health nurse to help with medication management.   - Ambulatory referral/consult to Outpatient Case Management  - Ambulatory referral/consult to Outpatient Case Management  - Ambulatory referral/consult to Home Health; Future    5. Calcification of both carotid arteries  Chronic and stable. No acute issues. Consider statin and aspirin therapy. Follow up with PCP.    6. Dyslipidemia  Chronic and unsure of status since last lipid panel obtained in 2016. Lipid panel ordered through home health. No acute issues. Continue current management. Follow up with PCP.    7. Debility; Dependence on other enabling machines and devices; Abnormality of gait and mobility  Chronic and stable. Bedbound. Instructed patient's family on frequent turning and raising heels off the bed to prevent pressure ulcers. Fall precautions.  Follow up with PCP.  - Ambulatory referral/consult to Outpatient Case Management  - Ambulatory referral/consult to Outpatient Case Management  - Ambulatory referral/consult to Home Health; Future    10. History of prostate cancer  S/p prostatectomy. Chronic and stable. No acute issues. Needs PSA. Order with home health. Follow up with  PCP.    11. Malnutrition, unspecified type  This problem is currently not controlled. Please follow up with your PCP as planned to discuss adjustments to your treatment plan.    12. Hx of AKA (above knee amputation), left  Due to ulceration to the left foot which led to osteomyelitis resulting in left aka. Stump intact. Fall precautions. Patient is bedbound.     13. Decubitus ulcer of sacral region, stage 3  This problem is a new problem and is currently not controlled. Home health wound care ordered. Instructed patient's family on frequent turning and raising heels off the bed to prevent pressure ulcers. Follow up with your PCP as planned to discuss adjustments to your treatment plan.        Provided Matt with a 5-10 year written screening schedule and personal prevention plan. Recommendations were developed using the USPSTF age appropriate recommendations. Education, counseling, and referrals were provided as needed.  After Visit Summary printed and given to patient which includes a list of additional screenings\tests needed.    Follow up in about 1 year (around 7/2/2021) for annual wellness visit.      IRLANDA Javed    Advance directives not applicable as patient has advanced dementia.

## 2021-01-31 NOTE — PROGRESS NOTES
Bedside and Verbal shift change report given to [address] (oncoming nurse) by Pako Rosenberg RN (offgoing nurse). Report included the following information SBAR, Kardex, Intake/Output, MAR and Recent Results. Shift assessment complete, pt AO x3, unsure of location (hospital). Pt on 4L NC expiratory wheezing in lung fields. UOP 400ml, pt voiding lisa clear urine through external catheter. White board updated & call light within reach. 1600 Reassessment, pt requests diet change and states \"I am not doing the colonoscopy tomorrow, I am going home tomorrow. \" Hospitalist notified, will notify GI.   1640 Dr. Earl Mariscal notified of pt's refusal of colonoscopy. Bedside and Verbal shift change report given to Eugene MURPHY (oncoming nurse) by Edwige Shin RN (offgoing nurse). Report included the following information SBAR, Kardex, Intake/Output, MAR and Recent Results.

## 2021-01-31 NOTE — PROGRESS NOTES
Problem: Falls - Risk of  Goal: *Absence of Falls  Description: Document Dirk Dunaway Fall Risk and appropriate interventions in the flowsheet.   Outcome: Progressing Towards Goal  Note: Fall Risk Interventions:  Mobility Interventions: Assess mobility with egress test, Bed/chair exit alarm, Communicate number of staff needed for ambulation/transfer, Patient to call before getting OOB, PT Consult for mobility concerns, PT Consult for assist device competence, Utilize walker, cane, or other assistive device    Mentation Interventions: Bed/chair exit alarm, Adequate sleep, hydration, pain control, Reorient patient, Room close to nurse's station, Update white board, Toileting rounds    Medication Interventions: Teach patient to arise slowly, Patient to call before getting OOB    Elimination Interventions: Call light in reach, Patient to call for help with toileting needs    History of Falls Interventions: Bed/chair exit alarm, Consult care management for discharge planning, Investigate reason for fall         Problem: Patient Education: Go to Patient Education Activity  Goal: Patient/Family Education  Outcome: Progressing Towards Goal     Problem: Knee Replacement: Day of Surgery/Unit  Goal: Activity/Safety  Outcome: Progressing Towards Goal  Goal: Consults, if ordered  Outcome: Progressing Towards Goal  Goal: Diagnostic Test/Procedures  Outcome: Progressing Towards Goal  Goal: Nutrition/Diet  Outcome: Progressing Towards Goal  Goal: Medications  Outcome: Progressing Towards Goal  Goal: Respiratory  Outcome: Progressing Towards Goal  Goal: Treatments/Interventions/Procedures  Outcome: Progressing Towards Goal  Goal: Psychosocial  Outcome: Progressing Towards Goal  Goal: *Initiate mobility  Outcome: Progressing Towards Goal  Goal: *Optimal pain control at patient's stated goal  Outcome: Progressing Towards Goal  Goal: *Hemodynamically stable  Outcome: Progressing Towards Goal     Problem: Knee Replacement: Post-Op Day 1  Goal: Activity/Safety  Outcome: Progressing Towards Goal  Goal: Diagnostic Test/Procedures  Outcome: Progressing Towards Goal  Goal: Nutrition/Diet  Outcome: Progressing Towards Goal  Goal: Medications  Outcome: Progressing Towards Goal  Goal: Respiratory  Outcome: Progressing Towards Goal  Goal: Treatments/Interventions/Procedures  Outcome: Progressing Towards Goal  Goal: Psychosocial  Outcome: Progressing Towards Goal  Goal: Discharge Planning  Outcome: Progressing Towards Goal  Goal: *Demonstrates progressive activity  Outcome: Progressing Towards Goal  Goal: *Optimal pain control at patient's stated goal  Outcome: Progressing Towards Goal  Goal: *Hemodynamically stable  Outcome: Progressing Towards Goal  Goal: *Discharge plan identified  Outcome: Progressing Towards Goal     Problem: Pain  Goal: *Control of Pain  Outcome: Progressing Towards Goal     Problem: Patient Education: Go to Patient Education Activity  Goal: Patient/Family Education  Outcome: Progressing Towards Goal     Problem: Patient Education: Go to Patient Education Activity  Goal: Patient/Family Education  Outcome: Progressing Towards Goal     Problem: Patient Education: Go to Patient Education Activity  Goal: Patient/Family Education  Outcome: Progressing Towards Goal     Problem: Pressure Injury - Risk of  Goal: *Prevention of pressure injury  Description: Document Christophe Scale and appropriate interventions in the flowsheet.   Outcome: Progressing Towards Goal     Problem: Patient Education: Go to Patient Education Activity  Goal: Patient/Family Education  Outcome: Progressing Towards Goal

## 2021-01-31 NOTE — PROGRESS NOTES
Progress Note     Patient: Yair Marroquin MRN: [id number]  SSN: [id number]    YOB: 1950  Age: 69 y.o. Sex: female      POD:   6 Days Post-Op  S/P:    Procedure(s):  ESOPHAGOGASTRODUODENOSCOPY (EGD); CLIP PLACEMENT     Subjective:   Still with some pain but did more than she thought she would be able with PT. She would like to go home rather than SNF due to pandemic. Objective:     Patient Vitals for the past 12 hrs:   BP Temp Pulse Resp SpO2   01/31/21 1203 119/61 98 °F (36.7 °C) 78 20 94 %   01/31/21 0843 (!) 139/92 97.9 °F (36.6 °C) 87 20 95 %   01/31/21 0350 (!) 144/89 98 °F (36.7 °C) 77 22 100 %     Recent Labs     01/31/21  0828 01/31/21  0120   HGB 10.2* 11.1*   HCT 33.1* 33.2*   INR  --  1.2   NA  --  137   K  --  3.8   CL  --  101   CO2  --  31   BUN  --  21*   CREA  --  0.76   GLU  --  186*       Pt. resting in bed. Lower extremity operative dressing intact with blood shadowing. Neurovascularly intact. Assessment:     S/P R TKR       Plan:   1. Continue pain management/ ice to operative area. 2. Continue to progress with PT/OT. Patient encouraged to perform flexion/extension exercises when she is seated in order to keep her knee from getting too stiff. 3. Discharge planning - she would prefer to go home  4. DVT prophylaxis with Lovenox as per medicine.   5. She is not interested in the colonoscopy as recommended

## 2021-01-31 NOTE — PROGRESS NOTES
----- Message from Karrie Vazquez MD sent at 5/4/2017  3:30 PM CDT -----  Thanks for your help. I sent Elavil 50 mg to her pharmacy. Will call her to let her know  ----- Message -----     From: Tano Quintero, PhD     Sent: 5/4/2017   1:48 PM       To: Karrie Vazquez MD    FYI- I saw Mrs. St today for the first time in 6 months.  She seems very depressed and agitated. She started the Lexapro you initially prescribed in 11/2016, but stopped after 1 week because it increased her heart-rate.  I do think she needs something for depression- she said she had considered going into a psychiatric facility last week due to depression.  (She seems to be tolerating the Elavil she takes at night pretty well, so bumping that up to depression dosing might be an alternative, if medically appropriate.)     Bedside and Verbal shift change report given to [facility], RN (oncoming nurse) by Maury Campbell RN (offgoing nurse). Report included the following information SBAR, Kardex, Intake/Output, MAR, Accordion and Recent Results.

## 2021-02-01 ENCOUNTER — APPOINTMENT (OUTPATIENT)
Dept: VASCULAR SURGERY | Age: 71
DRG: 982 | End: 2021-02-01
Attending: INTERNAL MEDICINE
Payer: MEDICARE

## 2021-02-01 VITALS
DIASTOLIC BLOOD PRESSURE: 87 MMHG | SYSTOLIC BLOOD PRESSURE: 152 MMHG | OXYGEN SATURATION: 93 % | HEIGHT: 69 IN | HEART RATE: 65 BPM | WEIGHT: 293 LBS | RESPIRATION RATE: 18 BRPM | TEMPERATURE: 98.5 F | BODY MASS INDEX: 43.4 KG/M2

## 2021-02-01 LAB
BASOPHILS # BLD: 0 K/UL (ref 0–0.1)
BASOPHILS # BLD: 0 K/UL (ref 0–0.1)
BASOPHILS NFR BLD: 0 % (ref 0–3)
BASOPHILS NFR BLD: 0 % (ref 0–3)
DIFFERENTIAL METHOD BLD: ABNORMAL
DIFFERENTIAL METHOD BLD: ABNORMAL
EOSINOPHIL # BLD: 0.1 K/UL (ref 0–0.4)
EOSINOPHIL # BLD: 0.8 K/UL (ref 0–0.4)
EOSINOPHIL NFR BLD: 1 % (ref 0–5)
EOSINOPHIL NFR BLD: 5 % (ref 0–5)
ERYTHROCYTE [DISTWIDTH] IN BLOOD BY AUTOMATED COUNT: 14.1 % (ref 11.6–14.5)
ERYTHROCYTE [DISTWIDTH] IN BLOOD BY AUTOMATED COUNT: 14.1 % (ref 11.6–14.5)
GLUCOSE BLD STRIP.AUTO-MCNC: 148 MG/DL (ref 70–110)
GLUCOSE BLD STRIP.AUTO-MCNC: 152 MG/DL (ref 70–110)
GLUCOSE BLD STRIP.AUTO-MCNC: 194 MG/DL (ref 70–110)
HCT VFR BLD AUTO: 34.5 % (ref 35–45)
HCT VFR BLD AUTO: 36.9 % (ref 35–45)
HGB BLD-MCNC: 11 G/DL (ref 12–16)
HGB BLD-MCNC: 11.7 G/DL (ref 12–16)
INR PPP: 1.2 (ref 0.8–1.2)
LYMPHOCYTES # BLD: 2 K/UL (ref 0.8–3.5)
LYMPHOCYTES # BLD: 2.5 K/UL (ref 0.8–3.5)
LYMPHOCYTES NFR BLD: 15 % (ref 20–51)
LYMPHOCYTES NFR BLD: 16 % (ref 20–51)
MCH RBC QN AUTO: 29.5 PG (ref 24–34)
MCH RBC QN AUTO: 29.7 PG (ref 24–34)
MCHC RBC AUTO-ENTMCNC: 31.7 G/DL (ref 31–37)
MCHC RBC AUTO-ENTMCNC: 31.9 G/DL (ref 31–37)
MCV RBC AUTO: 92.9 FL (ref 74–97)
MCV RBC AUTO: 93.2 FL (ref 74–97)
MONOCYTES # BLD: 0.6 K/UL (ref 0–1)
MONOCYTES # BLD: 1.5 K/UL (ref 0–1)
MONOCYTES NFR BLD: 5 % (ref 2–9)
MONOCYTES NFR BLD: 9 % (ref 2–9)
NEUTS BAND NFR BLD MANUAL: 4 % (ref 0–5)
NEUTS SEG # BLD: 10 K/UL (ref 1.8–8)
NEUTS SEG # BLD: 11.1 K/UL (ref 1.8–8)
NEUTS SEG NFR BLD: 67 % (ref 42–75)
NEUTS SEG NFR BLD: 78 % (ref 42–75)
NRBC BLD-RTO: 1 PER 100 WBC
NRBC BLD-RTO: 2 PER 100 WBC
PLATELET # BLD AUTO: 220 K/UL (ref 135–420)
PLATELET # BLD AUTO: 259 K/UL (ref 135–420)
PMV BLD AUTO: 10.3 FL (ref 9.2–11.8)
PMV BLD AUTO: 10.7 FL (ref 9.2–11.8)
PROTHROMBIN TIME: 15.1 SEC (ref 11.5–15.2)
RBC # BLD AUTO: 3.7 M/UL (ref 4.2–5.3)
RBC # BLD AUTO: 3.97 M/UL (ref 4.2–5.3)
RBC MORPH BLD: ABNORMAL
WBC # BLD AUTO: 12.7 K/UL (ref 4.6–13.2)
WBC # BLD AUTO: 16.5 K/UL (ref 4.6–13.2)

## 2021-02-01 PROCEDURE — 82962 GLUCOSE BLOOD TEST: CPT

## 2021-02-01 PROCEDURE — 77010033678 HC OXYGEN DAILY

## 2021-02-01 PROCEDURE — 85610 PROTHROMBIN TIME: CPT

## 2021-02-01 PROCEDURE — 97530 THERAPEUTIC ACTIVITIES: CPT

## 2021-02-01 PROCEDURE — 97116 GAIT TRAINING THERAPY: CPT

## 2021-02-01 PROCEDURE — 36415 COLL VENOUS BLD VENIPUNCTURE: CPT

## 2021-02-01 PROCEDURE — 74011250636 HC RX REV CODE- 250/636: Performed by: FAMILY MEDICINE

## 2021-02-01 PROCEDURE — 74011250637 HC RX REV CODE- 250/637: Performed by: FAMILY MEDICINE

## 2021-02-01 PROCEDURE — 74011250636 HC RX REV CODE- 250/636: Performed by: PHYSICIAN ASSISTANT

## 2021-02-01 PROCEDURE — 93975 VASCULAR STUDY: CPT

## 2021-02-01 PROCEDURE — 74011000250 HC RX REV CODE- 250: Performed by: FAMILY MEDICINE

## 2021-02-01 PROCEDURE — 74011250637 HC RX REV CODE- 250/637: Performed by: PHYSICIAN ASSISTANT

## 2021-02-01 PROCEDURE — 97110 THERAPEUTIC EXERCISES: CPT

## 2021-02-01 PROCEDURE — 85025 COMPLETE CBC W/AUTO DIFF WBC: CPT

## 2021-02-01 PROCEDURE — 74011636637 HC RX REV CODE- 636/637: Performed by: FAMILY MEDICINE

## 2021-02-01 PROCEDURE — C9113 INJ PANTOPRAZOLE SODIUM, VIA: HCPCS | Performed by: FAMILY MEDICINE

## 2021-02-01 RX ORDER — ENOXAPARIN SODIUM 100 MG/ML
30 INJECTION SUBCUTANEOUS EVERY 12 HOURS
Qty: 10 SYRINGE | Refills: 0 | Status: SHIPPED | OUTPATIENT
Start: 2021-02-01

## 2021-02-01 RX ORDER — HYDROCODONE BITARTRATE AND ACETAMINOPHEN 5; 325 MG/1; MG/1
1-2 TABLET ORAL
Qty: 40 TAB | Refills: 0 | Status: SHIPPED | OUTPATIENT
Start: 2021-02-01 | End: 2021-02-06

## 2021-02-01 RX ORDER — PANTOPRAZOLE SODIUM 40 MG/1
40 TABLET, DELAYED RELEASE ORAL
Status: DISCONTINUED | OUTPATIENT
Start: 2021-02-02 | End: 2021-02-01 | Stop reason: HOSPADM

## 2021-02-01 RX ADMIN — INSULIN LISPRO 2 UNITS: 100 INJECTION, SOLUTION INTRAVENOUS; SUBCUTANEOUS at 12:57

## 2021-02-01 RX ADMIN — HYDROCODONE BITARTRATE AND ACETAMINOPHEN 1 TABLET: 5; 325 TABLET ORAL at 07:03

## 2021-02-01 RX ADMIN — CARVEDILOL 25 MG: 12.5 TABLET, FILM COATED ORAL at 18:17

## 2021-02-01 RX ADMIN — LEVOTHYROXINE, LIOTHYRONINE 60 MG: 19; 4.5 TABLET ORAL at 08:34

## 2021-02-01 RX ADMIN — ENOXAPARIN SODIUM 30 MG: 30 INJECTION SUBCUTANEOUS at 06:59

## 2021-02-01 RX ADMIN — MOXIFLOXACIN 400 MG: 400 TABLET, FILM COATED ORAL at 07:03

## 2021-02-01 RX ADMIN — SPIRONOLACTONE 12.5 MG: 25 TABLET ORAL at 12:54

## 2021-02-01 RX ADMIN — ENOXAPARIN SODIUM 30 MG: 30 INJECTION SUBCUTANEOUS at 18:18

## 2021-02-01 RX ADMIN — HYDROCODONE BITARTRATE AND ACETAMINOPHEN 1 TABLET: 5; 325 TABLET ORAL at 14:53

## 2021-02-01 RX ADMIN — DILTIAZEM HYDROCHLORIDE 120 MG: 120 CAPSULE, COATED, EXTENDED RELEASE ORAL at 08:37

## 2021-02-01 RX ADMIN — INSULIN LISPRO 2 UNITS: 100 INJECTION, SOLUTION INTRAVENOUS; SUBCUTANEOUS at 06:59

## 2021-02-01 RX ADMIN — MONTELUKAST 10 MG: 10 TABLET, FILM COATED ORAL at 12:54

## 2021-02-01 RX ADMIN — PANTOPRAZOLE SODIUM 40 MG: 40 INJECTION, POWDER, FOR SOLUTION INTRAVENOUS at 08:38

## 2021-02-01 NOTE — PROGRESS NOTES
Problem: Mobility Impaired (Adult and Pediatric)  Goal: *Acute Goals and Plan of Care (Insert Text)  Description: In 1-5 days pt will be able to perform:  ST.  Bed mobility:  Rolling L to R to L modified independent for positioning. 2.  Supine to sit to supine CGA with HR for meals. 3.  Sit to stand to sit CGA with RW in prep for ambulation. LT.  Gait:  Ambulate >50ft CGA with RW, WBAT, for home/community mobility. 2.  Stair Negotiation:  Ascend/descend >1 step CGA with HR for home entry. 3.  Activity tolerance: Tolerate up in chair 1-2 hours for ADL's.  4.  Patient/Family Education:  Patient/family to be independent with HEP for follow-up care and safe discharge. Outcome: Progressing Towards Goal  Note: []  Patient has met MD sandra kemp for d/c home   []  Recommend HH with 24 hour adult care   [x]  Benefit from additional acute PT session to address:  transfer training, gait training    PHYSICAL THERAPY TREATMENT    Patient: Arlene Wheatley (25 y.o. female)  Date: 2021  Diagnosis: Osteoarthritis of right knee [M17.11] <principal problem not specified>  Procedure(s) (LRB):  ESOPHAGOGASTRODUODENOSCOPY (EGD); CLIP PLACEMENT (N/A) 5 Days Post-Op  Precautions: Fall, WBAT(R TKA)      ASSESSMENT:  Pt reporting she is not going to rehab or having colonoscopy and perseverating on no food because of the planned colonoscopy today. Pt required additional time for all activity. Pt required AA for optimal therapeutic exercises. Pt on O2 during entire session and rating pain during mobility 8/10 on numerical pain scale. Pt required mod A for supine>sit, max A for sit>supine. Sit<>stand several trials 2/3 successful requiring mod/max A w/ bed raised for success, RW, WBAT,and GB. Pt fatigues quickly and has limited tolerance for standing. Pt was able to take a few R side lateral steps along EOB but required several attempts and mod A. Pt cont to need significant A for mobility.   Strongly recommend rehab however pt resistant and wanting to d/c home. Pt would need medical transport home and 24 hour care, HHPT. Pt returned to supine in bed w/ all needs within reach, B SCD reapplied and ice pack to R knee. Progression toward goals:   []      Improving appropriately and progressing toward goals  [x]      Improving slowly and progressing toward goals  []      Not making progress toward goals and plan of care will be adjusted     PLAN:  Patient continues to benefit from skilled intervention to address the above impairments. Continue treatment per established plan of care. Discharge Recommendations:  Rehab  Further Equipment Recommendations for Discharge:  N/A     SUBJECTIVE:   Patient stated I just want to go home, my son can help me.     OBJECTIVE DATA SUMMARY:   Critical Behavior:  Neurologic State: Alert  Orientation Level: Oriented to person, Oriented to place, Oriented to situation  Cognition: Follows commands  Safety/Judgement: Decreased awareness of need for assistance, Decreased awareness of need for safety, Lack of insight into deficits  Functional Mobility Training:  Bed Mobility:  Supine to Sit: Moderate assistance; Additional time  Sit to Supine: Maximum assistance; Additional time  Scooting: Maximum assistance  Transfers:  Sit to Stand: Moderate assistance;Maximum assistance; Additional time(vc; bed elevated)  Stand to Sit: Moderate assistance(vc)  Balance:  Sitting: Impaired  Sitting - Static: Fair (occasional)  Sitting - Dynamic: Fair (occasional)  Standing: Impaired; With support  Standing - Static: Poor;Fair;Constant support  Standing - Dynamic : Poor   Ambulation/Gait Training:  Distance (ft): 2 Feet (ft)  Assistive Device: Walker, rolling;Gait belt  Ambulation - Level of Assistance:  Moderate assistance  Gait Abnormalities: Antalgic;Decreased step clearance  Right Side Weight Bearing: As tolerated  Base of Support: Widened;Center of gravity altered  Stance: Right decreased  Speed/Dinah: Slow;Delayed  Step Length: Left shortened;Right shortened  Swing Pattern: Left asymmetrical;Right asymmetrical  Interventions: Safety awareness training; Tactile cues; Verbal cues; Visual/Demos  Neuro Re-Education:    Therapeutic Exercises:       EXERCISE   Sets   Reps   Active Active Assist   Passive Self ROM   Comments   Ankle Pumps 1 10  [x] [x] [] []    Quad Sets/Glut Sets 1 10  [x] [] [] [] Hold for 5 secs   Hamstring Sets   [] [] [] []    Short Arc Quads   [] [] [] []    Heel Slides 1 10 [] [x] [] []    Straight Leg Raises   [] [] [] []    Hip Add   [] [] [] [] Hold for 5 secs, w/ pillow squeeze   Long Arc Quads 1 20 [x] [] [] []    Seated Marching   [] [] [] []    Standing Marching   [] [] [] []       [] [] [] []        Pain:  Pain level pre-treatment: 0/10  Pain level post-treatment: 0/10   Pain Intervention(s): Medication (see MAR); Rest, Ice, Repositioning   Response to intervention: Nurse notified, See doc flow    Activity Tolerance:   Fair -  Please refer to the flowsheet for vital signs taken during this treatment. After treatment:   [] Patient left in no apparent distress sitting up in chair  [x] Patient left in no apparent distress in bed  [x] Call bell left within reach  [x] Nursing notified  [] Caregiver present  [] Bed alarm activated  [] SCDs applied      COMMUNICATION/EDUCATION:   [x]         Role of Physical Therapy in the acute care setting. [x]         Fall prevention education was provided and the patient/caregiver indicated understanding. [x]         Patient/family have participated as able in working toward goals and plan of care. [x]         Patient/family agree to work toward stated goals and plan of care. []         Patient understands intent and goals of therapy, but is neutral about his/her participation.   []         Patient is unable to participate in stated goals/plan of care: ongoing with therapy staff.  []         Other:        Felix Hernandez, PT Time Calculation: 51 mins

## 2021-02-01 NOTE — PROGRESS NOTES
Progress Note     Patient: Norma Perez MRN: [id number]  SSN: [id number]    YOB: 1950  Age: 69 y.o. Sex: female      POD:    5 Days Post-Op  S/P:    Procedure(s):  ESOPHAGOGASTRODUODENOSCOPY (EGD); CLIP PLACEMENT     Subjective:   Pt is with complaints of some pain in the knee. She is now refusing the colonoscopy and SNF placement and wants to be discharged home. PT has recommended SNF due to patient's poor progression with therapy sessions but patient insists she would like to go home and have her son help her. Objective:     Patient Vitals for the past 12 hrs:   BP Temp Pulse Resp SpO2   02/01/21 0816 (!) 106/59 98.3 °F (36.8 °C) 82 19 94 %   02/01/21 0345 (!) 142/76 97.9 °F (36.6 °C) 72 20 97 %   02/01/21 0000 121/76 98.4 °F (36.9 °C) 64 20 100 %     Recent Labs     02/01/21  0135 01/31/21  0120 01/31/21  0120   HGB 11.0*   < > 11.1*   HCT 34.5*   < > 33.2*   INR 1.2  --  1.2   NA  --   --  137   K  --   --  3.8   CL  --   --  101   CO2  --   --  31   BUN  --   --  21*   CREA  --   --  0.76   GLU  --   --  186*    < > = values in this interval not displayed. Pt. resting in bed. Lower extremity operative dressing intact with blood shadowing. Neurovascularly intact. Assessment:     S/P R TKR         Plan:   1. Continue pain management/ ice to operative area. 2. Continue to progress with PT/OT. 3. Discharge planning. SNF was recommended due to patient's poor performance in PT but patient declined and wants to be discharged home against recommendations. 4. H&H has remained stable (though slightly lower than lab normals). Will discharge patient on Lovenox for anticoagulation. Patient encouraged to contact cardiologist for further recommendations on when to restart coumadin. Home health nurses will record INR and again this can be managed by cardiology upon discharge.

## 2021-02-01 NOTE — PROGRESS NOTES
Transition of Care (ZARINA) Plan: Home with home health, family assistance and MD follow-up    CM notes that the patient is now declining the colonoscopy and SNF placement. Patient and son counseled multiple times regarding therapy's recommendation of SNF placement for continued physical therapy and rehab and informed that this is recommended as the safest discharge plan. Ultimately the patient wishes to discharge to her friend's house where her son will be available to care for her. No immediate CM needs at this time, CM to follow the patient's progress and be available to assist as needed. CMS referral placed.       [facility] Transportation:       How is patient being transported at discharge? Family     When? 24-48 hours, when medically stable     Is transport scheduled? NA    Follow-up appointment and transportation:     PCP? NA     Specialist? Dr. Ojeda     Time/Date? 2/11/2021 @ 4:00 pm      Who is transporting to the follow-up appointment? Family      Is transport for follow up appointment scheduled? NA    Communication plan (with patient/family):      Who is being called?  Patient or Next of Kin?  Responsible party? Patient      What number(s) is to be used? [phone] (cell) or [phone] (Son, Mark)      What service provider is calling for [facility] services? [facility]     When are they calling? Within 24 hours of discharge    Care Management Interventions  PCP Verified by CM: Yes  Mode of Transport at Discharge: Other (see comment)(family)  Transition of Care Consult (CM Consult): Home Health  [facility]: No  Reason Outside [employer] Agency Chosen: Physician referred to specific agency  Physical Therapy Consult: Yes  Occupational Therapy Consult: Yes  Current Support Network: Family Lives Nearby, Relative's Home(patient to discharge to friend's house where son will be available to care for her)  Confirm Follow Up Transport: Family  The Plan for Transition of Care is Related to the Following  Treatment Goals : Right total knee replacement  The Patient and/or Patient Representative was Provided with a Choice of Provider and Agrees with the Discharge Plan?: Yes  Name of the Patient Representative Who was Provided with a Choice of Provider and Agrees with the Discharge Plan[facility]  [place] of Choice List was Provided with Basic Dialogue that Supports the Patient's Individualized Plan of Care/Goals, Treatment Preferences and Shares the Quality Data Associated with the Providers?: Yes  Discharge Location  Discharge Placement: Home with home health

## 2021-02-01 NOTE — PROGRESS NOTES
Problem: Falls - Risk of  Goal: *Absence of Falls  Description: Document Dickson Duvall Fall Risk and appropriate interventions in the flowsheet.   Outcome: Progressing Towards Goal  Note: Fall Risk Interventions:  Mobility Interventions: Communicate number of staff needed for ambulation/transfer, Patient to call before getting OOB, Utilize walker, cane, or other assistive device    Mentation Interventions: Bed/chair exit alarm, Adequate sleep, hydration, pain control, Reorient patient, Room close to nurse's station, Update white board, Toileting rounds    Medication Interventions: Teach patient to arise slowly, Patient to call before getting OOB    Elimination Interventions: Call light in reach, Patient to call for help with toileting needs, Toileting schedule/hourly rounds    History of Falls Interventions: Consult care management for discharge planning, Investigate reason for fall         Problem: Knee Replacement: Day of Surgery/Unit  Goal: Activity/Safety  Outcome: Progressing Towards Goal  Goal: Consults, if ordered  Outcome: Progressing Towards Goal  Goal: Diagnostic Test/Procedures  Outcome: Progressing Towards Goal  Goal: Nutrition/Diet  Outcome: Progressing Towards Goal  Goal: Medications  Outcome: Progressing Towards Goal  Goal: Respiratory  Outcome: Progressing Towards Goal  Goal: Treatments/Interventions/Procedures  Outcome: Progressing Towards Goal  Goal: Psychosocial  Outcome: Progressing Towards Goal  Goal: *Initiate mobility  Outcome: Progressing Towards Goal  Goal: *Optimal pain control at patient's stated goal  Outcome: Progressing Towards Goal  Goal: *Hemodynamically stable  Outcome: Progressing Towards Goal     Problem: Pain  Goal: *Control of Pain  Outcome: Progressing Towards Goal     Problem: Patient Education: Go to Patient Education Activity  Goal: Patient/Family Education  Outcome: Progressing Towards Goal     Problem: Patient Education: Go to Patient Education Activity  Goal: Patient/Family Education  Outcome: Progressing Towards Goal     Problem: Pressure Injury - Risk of  Goal: *Prevention of pressure injury  Description: Document Christophe Scale and appropriate interventions in the flowsheet.   Outcome: Progressing Towards Goal  Note: Pressure Injury Interventions:  Sensory Interventions: Assess changes in LOC    Moisture Interventions: Internal/External urinary devices, Absorbent underpads, Limit adult briefs    Activity Interventions: Pressure redistribution bed/mattress(bed type), Increase time out of bed    Mobility Interventions: Pressure redistribution bed/mattress (bed type), HOB 30 degrees or less    Nutrition Interventions: Document food/fluid/supplement intake, Offer support with meals,snacks and hydration    Friction and Shear Interventions: Apply protective barrier, creams and emollients, HOB 30 degrees or less, Transfer aides:transfer board/Jean lift/ceiling lift

## 2021-02-01 NOTE — DISCHARGE INSTRUCTIONS
DISCHARGE SUMMARY from Nurse    PATIENT INSTRUCTIONS:    After general anesthesia or intravenous sedation, for 24 hours or while taking prescription Narcotics:  · Limit your activities  · Do not drive and operate hazardous machinery  · Do not make important personal or business decisions  · Do  not drink alcoholic beverages  · If you have not urinated within 8 hours after discharge, please contact your surgeon on call. Report the following to your surgeon:  · Excessive pain, swelling, redness or odor of or around the surgical area  · Temperature over 100.5  · Nausea and vomiting lasting longer than 4 hours or if unable to take medications  · Any signs of decreased circulation or nerve impairment to extremity: change in color, persistent  numbness, tingling, coldness or increase pain  · Any questions    What to do at Home:  Recommended activity: {discharge activity:66720}, ***    If you experience any of the following symptoms ***, please follow up with ***. *  Please give a list of your current medications to your Primary Care Provider. *  Please update this list whenever your medications are discontinued, doses are      changed, or new medications (including over-the-counter products) are added. *  Please carry medication information at all times in case of emergency situations. These are general instructions for a healthy lifestyle:    No smoking/ No tobacco products/ Avoid exposure to second hand smoke  Surgeon General's Warning:  Quitting smoking now greatly reduces serious risk to your health.     Obesity, smoking, and sedentary lifestyle greatly increases your risk for illness    A healthy diet, regular physical exercise & weight monitoring are important for maintaining a healthy lifestyle    You may be retaining fluid if you have a history of heart failure or if you experience any of the following symptoms:  Weight gain of 3 pounds or more overnight or 5 pounds in a week, increased swelling in our hands or feet or shortness of breath while lying flat in bed. Please call your doctor as soon as you notice any of these symptoms; do not wait until your next office visit. The discharge information has been reviewed with the {PATIENT PARENT GUARDIAN:63547}. The {PATIENT PARENT GUARDIAN:34068} verbalized understanding. Discharge medications reviewed with the {Dishcarge meds reviewed DQDL:52315} and appropriate educational materials and side effects teaching were provided. ___________________________________________________________________________________________________________________________________Total Knee Arthroplasty Discharge Instructions           Dr. Aram Palma    Please take the time to review the following instructions before you leave the hospital and use them as guidelines during your recovery from surgery. If you have any questions you may contact my office at [phone]. Wound Care/Dressing Changes: You may change your dressing as needed. Beginning the 2 days after you are discharged from the hospital you should change your dressing daily. A big, bulky dressing isn't necessary as long as there isn't any drainage from the incisions. You can put a band-aid or Mepilex dressing over the incision and wear CRUZ hose as needed for comfort and swelling. No dressing is necessary if there is no drainage. It isn't necessary to apply antibiotic ointment to your incisions. Prineo tape will peel off in approximately 2-6 weeks. It does not need to be removed prior to that. When it begins to peel off you can cut the edges away with scissors. Showering/Bathing:    [x]   You may shower 2 days after surgery. Your dressing may be removed for showering. You may get your incisions wet in the shower. Don't vigorously scrub the area where your incisions are. Apply a clean, dry dressing after drying off the area of your incisions.   Don't take a tub bath, get in a swimming pool or [facility] until the incisions are completely healed, which is about 14 days. Do not soak your incisions under water. Weight Bearing Status/Activity:          You may walk as tolerated and perform your normal daily activities. Use a walker or a cane only if you need them. You should strive to achieve full range of motion in your knee as tolerated. We would like for you to return to your normal activities as soon as possible. Ice/Elevation:    Continue ice and elevation as needed for pain and swelling. Diet:    Resume your prehospital diet. If you have excessive nausea or vomitting call your doctor's office. Medications:        1. You will be given a prescription for pain medications when you are discharged from the hospital.  Take the medication as needed according to the directions on the prescription bottle. Possible side effects of the medication include dizziness, headache, nausea, vomiting, constipation and urinary retention. If you experience any of these side effects call the office so that we can assist you in relieving them. Discontinue the use of the pain medication if you develop itching, rash, shortness of breath or difficulties swallowing. If these symptoms become severe or aren't relieved by discontinuing the medication you should seek immediate medical attention. Refills of pain medication are authorized during office hours only. (8AM - 5PM Mon thru Fri)  2. If you were prescribed Percocet/oxycodone or Dilaudid/hydromorphone you must have a written prescription. These medications legally CANNOT be called in to a pharmacy. 3. Do not take Tylenol in addition to your pain medication as most of the pain medication already contains Tylenol. Do not exceed 4000 mg of Tylenol per day. Ex:  (hydrocodon 5/500mg = 500 mg of Tylenol)  4. You may resume the medication you were taking prior to your surgery. Pain medication may change the effects of any antidepressant medication.   If you have any questions about possible interactions between your regular medications and the pain medication you should consult the physician who prescribes your regular medications. Stool Softeners:    Pain medications can cause constipation. Stool softeners, warm prune juice and increasing your water and fiber intake can help prevent constipation. Do not take laxatives. Blood Thinner: You will be discharged on Lovenox to take twice daily. Please contact your cardiologist upon discharge for further anticoagulation recommendations. Home Health:  Begin In-Home Physical Therapy; 3 times a week to work on gait training, range of motion, strengthening, and weight bearing exercises as tolerable. Home health has been arranged for skilled nursing visits and physical therapy. If no one from the agency calls you on the day after you arrive home, please contact them at the number provided at discharge. Physical Therapy for gait training, joint range of motion and strengthening. Follow Up Appointment:     Please call [phone] for a follow appointment with Dr. Arthur Borges in 10-14 days from the time of your surgery. Please let our office know you are scheduling a post-op appointment. Signs and Symptoms to be Aware of: If any of the following signs and symptoms occur, you should contact Dr. Federico Maguire office. Please be advised if a problem arises which you feel requires immediate medical attention or you are unable to contact Dr. Federico Maguire office you should seek immediate medical attention at the emergency department or other health care facility you have access to. Signs and symptoms to watch for include:     1. A sudden increase in swelling and l or redness or warmth at the area your surgery was performed which isn't relieved by rest, ice and elevation.    2 Oral temperature greater than 101.5 degrees for 12 hours or more which isn't relieved by an increase in fluid intake and taking two Tylenol every 4-6 hours. 3 Excessive drainage from your incisions, or drainage which hasn't stopped by 72 hours after your surgery despite applying a compressive dressing, ice and elevation. 4 Calfpain, tenderness, redness or swelling which isn't relieved with rest and elevation. 5 Fever, chills, shortness ofbreath, chest pain, nausea, vomiting or other signs and symptoms which are of concern to you.      Other Instructions:

## 2021-02-01 NOTE — PROGRESS NOTES
Problem: Falls - Risk of  Goal: *Absence of Falls  Description: Document Green Salvia Fall Risk and appropriate interventions in the flowsheet. Outcome: Progressing Towards Goal  Note: Fall Risk Interventions:  Mobility Interventions: Patient to call before getting OOB, PT Consult for mobility concerns, Utilize walker, cane, or other assistive device    Mentation Interventions: Adequate sleep, hydration, pain control    Medication Interventions: Patient to call before getting OOB, Teach patient to arise slowly    Elimination Interventions: Call light in reach, Patient to call for help with toileting needs, Toileting schedule/hourly rounds    History of Falls Interventions: Door open when patient unattended, Investigate reason for fall         Problem: Pressure Injury - Risk of  Goal: *Prevention of pressure injury  Description: Document Christophe Scale and appropriate interventions in the flowsheet.   Outcome: Progressing Towards Goal  Note: Pressure Injury Interventions:  Sensory Interventions: Assess changes in LOC    Moisture Interventions: Absorbent underpads, Internal/External urinary devices, Maintain skin hydration (lotion/cream)    Activity Interventions: Pressure redistribution bed/mattress(bed type), PT/OT evaluation    Mobility Interventions: Pressure redistribution bed/mattress (bed type)    Nutrition Interventions: Document food/fluid/supplement intake    Friction and Shear Interventions: Apply protective barrier, creams and emollients, Minimize layers                Problem: Pain  Goal: *Control of Pain  Outcome: Progressing Towards Goal

## 2021-02-01 NOTE — PROGRESS NOTES
0730: No acute change overnight. Bedside and Verbal shift change report given to SYDNEE Garcia RN (oncoming nurse) by Ike Tavera RN (offgoing nurse). Report included the following information SBAR, Kardex, Intake/Output, MAR, Accordion and Recent Results.

## 2021-02-01 NOTE — PROGRESS NOTES
0740- Bedside and Verbal shift change report given to YARI Garcia RN (oncoming nurse) by JOSE MARTIN eKita RN (offgoing nurse). Report included the following information SBAR, Kardex, Intake/Output and MAR.     Shift uneventful. See flowsheets for shift details.     1840- I have reviewed discharge instructions with the patient.  The patient verbalized understanding. Mepilex on knee changed using sterile technique. Patient sent home with extra mepilex dressing.

## 2021-02-01 NOTE — WOUND CARE
Chart audited for low tip score,LOS, patient with high risk for skin breakdown. Skin Care & Pressure Relief Recommendations Minimize layers of linen Pads under patient to optimize support surface Turn/reposition approximately every 2 hours Pillow wedges Manage incontinence Promote continence; Skin Protective lotion/cream to buttocks and sacrum daily and as needed with incontinence care Offload heels pillows

## 2021-02-01 NOTE — DIABETES MGMT
GLYCEMIC CONTROL PROGRESS NOTE:    - known h/o T2DM,HbA1C within recommended range for age + comorbids on GLP1 RA home regimen  - BG out of target range non-ICU: < 180 mg/dL  - TDD = 6 units - Humalog Normal Insulin Sensitivity Corrective Coverage, recommend continue    Recent Glucose Results:   Lab Results   Component Value Date/Time    GLUCPOC 194 (H) 02/01/2021 06:03 AM    GLUCPOC 206 (H) 01/31/2021 09:52 PM    GLUCPOC 193 (H) 01/31/2021 04:03 PM     Diego Bustos MS, RN, CDE  Glycemic Control Team  [phone]  Pager [phone] (M-TH 8:00-4:30P)  *After Hours pager [phone]

## 2021-02-01 NOTE — PROGRESS NOTES
Occupational Therapy Treatment Attempt    Chart reviewed. Attempted Occupational Therapy Treatment, however, patient unable to be seen due to:  []  Nausea/vomiting  []  Eating  []  Pain  []  Patient too lethargic  []  Off Unit for testing/procedure  []  Dialysis treatment in progress  []  Telemetry Results  [x]  Other: Pt adamantly refusing. Pt states she is going home. Will f/u later as patient's schedule allows.    Thank you for this referral.  Kasandra Reyes, OTR/L, CSRS, CDCS, CFPS

## 2021-02-01 NOTE — PROGRESS NOTES
Hospitalist Progress Note    Patient: Erick Muñoz MRN: [id number]  CSN: [id number]    YOB: 1950  Age: 69 y.o. Sex: female    DOA: 1/25/2021 LOS:  LOS: 5 days          Chief Complaint:    Gi bleed      Assessment/Plan     68 y/o female with chronic atrial fibrillation, CHF, HTN, and morbid obesity status post right knee total arthroplasty, medicine consulted for gastrointestinal bleeding and hypotension. Hypotension resolved     HTN - stable     GI bleed-  S/p EGD with findings of duodenal ulcer  SHE HAS DECLINED COLONOSCOPY, she wants to eat and go home  Continue PPI  H&H stable  h pylori neg     CT abdomen and pelvis with no pancreatic mass.      Left staghorn calculus, no urinary sx    Diabetes, follow BG levels     DVT prophylaxis should be ordered by orthopedics for home, she has been on lovenox here and tolerated-course/timing referred to surgery for post op care-if questions, please confer with GI     Atrial fibrillation - HR stable     AMY -  CPAP at night.     Follow up with urology for staghorn calculi     Osteoarthritis of R knee    S/p right TKA  -- per ortho    Medicine will sign off as she declines further evaluation  Case management to help with d/c plans for home, as she declines rehab also     Disposition :  Patient Active Problem List   Diagnosis Code    Osteoarthritis of right knee M17.11    Atrial fibrillation ([address] I48.91    GI bleed K92.2    Hypotension I95.9       Subjective:    I just need to go home now  Denies BM over weekend  No bleeding  Wants to eat food, states she has to go home today, does not want a colonoscopy    Review of systems:    Constitutional: denies fevers  Respiratory: denies SOB  Cardiovascular: denies chest pain, palpitations  Gastrointestinal: denies nausea, vomiting, diarrhea      Vital signs/Intake and Output:  Visit Vitals  BP (!) 142/76 (BP 1 Location: Right upper arm, BP Patient Position: Supine)   Pulse 72   Temp 97.9 °F (36.6 °C)   Resp 20   Ht 5' 8.5\" (1.74 m)   Wt 140.6 kg (310 lb)   SpO2 97%   BMI 46.45 kg/m²     Current Shift:  No intake/output data recorded. Last three shifts:  01/30 1901 - 02/01 0700  In: 750 [P.O.:750]  Out: 1500 [Urine:1500]    Exam:    General: obese WF, alert, NAD, OX3  Head/Neck: NCAT, supple, No masses, No lymphadenopathy  CVS:Regular rate and rhythm, no M/R/G, S1/S2 heard, no thrill  Lungs:Clear to auscultation bilaterally, no wheezes, rhonchi, or rales  Abdomen: Soft, Nontender, No distention, Normal Bowel sounds, No hepatomegaly  Extremities: right knee dressed, comp sock in place  Neuro:grossly normal , follows commands  Psych:appropriate                Labs: Results:       Chemistry Recent Labs     01/31/21  0120   *      K 3.8      CO2 31   BUN 21*   CREA 0.76   CA 8.6   AGAP 5   BUCR 28*   AP 45   TP 6.6   ALB 4.4   GLOB 2.2   AGRAT 2.0*      CBC w/Diff Recent Labs     02/01/21  0135 01/31/21  0828 01/31/21  0120   WBC 12.7 10.9 11.6   RBC 3.70* 3.61* 3.67*   HGB 11.0* 10.2* 11.1*   HCT 34.5* 33.1* 33.2*    209 202   GRANS 78* 74* 60   LYMPH 16* 17* 14*   EOS 1 1 0      Cardiac Enzymes No results for input(s): CPK, CKND1, MAUREEN in the last 72 hours. No lab exists for component: CKRMB, TROIP   Coagulation Recent Labs     02/01/21  0135 01/31/21  0120   PTP 15.1 15.2   INR 1.2 1.2       Lipid Panel No results found for: CHOL, CHOLPOCT, CHOLX, CHLST, CHOLV, 047349, HDL, HDLP, LDL, LDLC, DLDLP, 858156, VLDLC, VLDL, TGLX, TRIGL, TRIGP, TGLPOCT, CHHD, CHHDX   BNP No results for input(s): BNPP in the last 72 hours.    Liver Enzymes Recent Labs     01/31/21  0120   TP 6.6   ALB 4.4   AP 45      Thyroid Studies No results found for: T4, T3U, TSH, TSHEXT     Procedures/imaging: see electronic medical records for all procedures/Xrays and details which were not copied into this note but were reviewed prior to creation of Johanna Nielsen MD

## 2021-02-01 NOTE — DISCHARGE SUMMARY
Patient: Rebeka Cortes               Sex: female         MRN: [id number]       YOB: 1950      Age:  69 y.o.        LOS:  LOS: 5 days                DOA: 1/25/2021    Discharge Date: 2/1/2021    Admission Diagnoses: Osteoarthritis of right knee [M17.11]    Discharge Diagnoses:    Problem List as of 2/1/2021 Date Reviewed: 1/28/2021          Codes Class Noted - Resolved    Atrial fibrillation ([facility] [address] ICD-10-CM: I48.91  ICD-9-CM: 427.31  1/26/2021 - Present        GI bleed ICD-10-CM: K92.2  ICD-9-CM: 578.9  1/26/2021 - Present        Hypotension ICD-10-CM: I95.9  ICD-9-CM: 458.9  1/26/2021 - Present        Osteoarthritis of right knee ICD-10-CM: M17.11  ICD-9-CM: 715.96  1/25/2021 - Present              This is a 69 y.o. female with a  history of ongoing knee pain secondary to degenerative joint disease. The patient has failed to respond to conservative care. The option of a total knee arthroplasty was discussed with the patient. Risks and benefits of the procedure were explained to the patient as well as other treatment options and possible surgical outcomes. The patient acknowledged and consent was obtained. The patient was therefore scheduled to undergo a right total knee arthroplasty with Dr. Aram Palma. The patient was taken to the operating room for the above-stated procedure. IV antibiotics were given prior to the incision. SCDs were used for DVT prophylaxis. The patient had an estimated intraoperative blood loss of 150 mL. The patient tolerated the procedure well without any complications, and was taken to the recovery room in stable condition. The patient was then transferred to the postoperative orthopedic floor for convalescence, PT, pain management, as well as discharge planning. Physical therapy and occupational therapy initiated their evaluation and treatment and continued to follow the patient until the patient was discharged.  For pain control, a femoral nerve block was used for a bolus the day of surgery and then removed. Exparel was injected intraoperatively as well for post-op pain management. The patient then was transitioned over to oral narcotics in which was well tolerated. DVT prophylaxis was initiated on the day of surgery including; aspirin, compression stockings and bilateral foot pumps. At the time of discharge, the patient was able to ambulate safely, go up and down stairs and had an understanding of the explicit discharge precautions and instructions following surgery. Home Health will come out to assist the patient with this. The patient was discharged to follow up with Dr. Ada Christianson in approximately 10 to 14 days. Discharge Condition: Good  DISPOSITION: To home. On the day of discharge the patient was afebrile. Vital signs were stable. The patient was in no acute distress. her Right knee incision was clean, dry, and intact. Extremity was warm and well-perfused, distally neurovascularly intact. DISCHARGE INSTRUCTIONS:    The patient will be discharged home on aspirin 81mg BID x 1 month for DVT prophylaxis. Continue physical therapy for range of motion, gait training and strengthening. Continue therapeutic exercises. Follow up in 10 to 14 days with Dr. Ada Christianson. DISCHARGE MEDICATIONS:   Current Discharge Medication List      START taking these medications    Details   enoxaparin (LOVENOX) 30 mg/0.3 mL injection 0.3 mL by SubCUTAneous route every twelve (12) hours every twelve (12) hours. Qty: 10 Syringe, Refills: 0         CONTINUE these medications which have CHANGED    Details   HYDROcodone-acetaminophen (NORCO) 5-325 mg per tablet Take 1-2 Tabs by mouth every four (4) hours as needed for Pain for up to 5 days. Max Daily Amount: 12 Tabs.   Qty: 40 Tab, Refills: 0    Associated Diagnoses: S/P TKR (total knee replacement), right         CONTINUE these medications which have NOT CHANGED    Details   montelukast (SINGULAIR) 10 mg tablet Take 10 mg by mouth daily (after lunch). carvediloL (COREG) 25 mg tablet Take 25 mg by mouth two (2) times daily (with meals). spironolactone (ALDACTONE) 25 mg tablet Take 12.5 mg by mouth daily (after lunch). thyroid, Pork, ([place] Thyroid) 60 mg tablet Take 60 mg by mouth daily. dilTIAZem ER (DILACOR XR) 120 mg capsule Take 120 mg by mouth daily. Cetirizine 10 mg cap Take  by mouth nightly. ALPRAZolam (XANAX) 1 mg tablet Take 0.5 mg by mouth nightly. potassium 99 mg tablet Take 188 mg by mouth daily. dulaglutide (Trulicity) 7.22 MH/9.0 mL sub-q pen 0.75 mg by SubCUTAneous route every seven (7) days. Every Wednesday      cholecalciferol, vitamin D3, (Vitamin D3) 50 mcg (2,000 unit) tab Take  by mouth.          STOP taking these medications       acetaminophen (TYLENOL) 500 mg tablet Comments:   Reason for Stopping:         warfarin (Coumadin) 3 mg tablet Comments:   Reason for Stopping:

## 2021-02-03 LAB
CELIAC PSV: 105.4 CM/S
DIST SMA PSV: 107 CM/S
MID SMA PSV: 134 CM/S
PROX SMA PSV: 134 CM/S

## 2021-02-08 NOTE — PROGRESS NOTES
2/8/21 12:15 - received call from Dr. Underwood [facility] office that patient has fallen several times since discharge and family is now requesting rehab; spoke with son Elina Randolph and he has called [facility], now called [address] [place] [address]; have shared info via "[employer] 26 to open referral and if accepted Kilo Arriaga will work directly with son to have patient admitted for continued care.

## (undated) DEVICE — PREP SKN CHLRAPRP APL 26ML STR --

## (undated) DEVICE — HOOD, PEEL-AWAY: Brand: FLYTE

## (undated) DEVICE — SUTURE STRATAFIX SZ 1 L14IN ABSRB VLT L36MM MO-4 TAPERPOINT SXPD2B400

## (undated) DEVICE — TRAP SPEC COLL POLYP POLYSTYR --

## (undated) DEVICE — Device

## (undated) DEVICE — PACK PROCEDURE SURG TOT KNEE CUST

## (undated) DEVICE — CLIP HEMSTAS L2300MM DIA275MM OPNING 11MM OPN CLOSE

## (undated) DEVICE — SOL IRR STRL H2O 1500ML BTL --

## (undated) DEVICE — SYR LR LCK 1ML GRAD NSAF 30ML --

## (undated) DEVICE — ZIMMER® STERILE DISPOSABLE TOURNIQUET CUFF WITH PROTECTIVE SLEEVE AND PLC, SINGLE PORT, SINGLE BLADDER, 34 IN. (86 CM)

## (undated) DEVICE — MOUTHPIECE ENDOSCP 20X27MM --

## (undated) DEVICE — SINGLE PORT MANIFOLD: Brand: NEPTUNE 2

## (undated) DEVICE — SUT VCRL + 1 36IN CT1 VIO --

## (undated) DEVICE — SYSTEM SKIN CLSR 22CM DERMBND PRINEO

## (undated) DEVICE — OSCILLATING TIP SAW CARTRIDGE: Brand: PRECISION FALCON

## (undated) DEVICE — NEEDLE HYPO 21GA L1.5IN INTRAMUSCULAR S STL LATCH BVL UP

## (undated) DEVICE — SYR 10ML LUER LOK 1/5ML GRAD --

## (undated) DEVICE — GARMENT COMPR M FOR 13IN FT INTMIT SGL BLDR HEM FORC II

## (undated) DEVICE — BLADE ELECTRODE: Brand: EDGE

## (undated) DEVICE — NDL SPNE QNCKE 18GX3.5IN LF --

## (undated) DEVICE — SOL IRRIGATION INJ NACL 0.9% 500ML BTL

## (undated) DEVICE — SUTURE MCRYL SZ 2-0 L36IN ABSRB UD L36MM CT-1 1/2 CIR Y945H

## (undated) DEVICE — KENDALL RADIOLUCENT FOAM MONITORING ELECTRODE RECTANGULAR SHAPE: Brand: KENDALL

## (undated) DEVICE — PIN GUID SQ 3.2X89.5

## (undated) DEVICE — STERILE POLYISOPRENE POWDER-FREE SURGICAL GLOVES: Brand: PROTEXIS

## (undated) DEVICE — MAJ-1414 SINGLE USE ADPATER BIOPSY VALV: Brand: SINGLE USE ADAPTOR BIOPSY VALVE

## (undated) DEVICE — SHEET,DRAPE,70X85,STERILE: Brand: MEDLINE

## (undated) DEVICE — SOLUTION IRRIG 3000ML LAC R FLX CONT

## (undated) DEVICE — REM POLYHESIVE ADULT PATIENT RETURN ELECTRODE: Brand: VALLEYLAB

## (undated) DEVICE — GARMENT,MEDLINE,DVT,INT,CALF,MED, GEN2: Brand: MEDLINE

## (undated) DEVICE — SUTURE MCRYL SZ 2-0 L27IN ABSRB VLT CT-1 L36MM 1/2 CIR TAPR Y339H

## (undated) DEVICE — SPONGE GZ W4XL4IN COT 12 PLY TYP VII WVN C FLD DSGN

## (undated) DEVICE — BLADE

## (undated) DEVICE — OPTIFOAM GENTLE SA, POSTOP, 4X12: Brand: MEDLINE

## (undated) DEVICE — INTENDED FOR TISSUE SEPARATION, AND OTHER PROCEDURES THAT REQUIRE A SHARP SURGICAL BLADE TO PUNCTURE OR CUT.: Brand: BARD-PARKER ® CARBON RIB-BACK BLADES

## (undated) DEVICE — TUBING, SUCTION, 1/4" X 12', STRAIGHT: Brand: MEDLINE

## (undated) DEVICE — 3M™ STERI-DRAPE™ U-DRAPE 1015: Brand: STERI-DRAPE™

## (undated) DEVICE — HANDPIECE SET WITH HIGH FLOW TIP AND SUCTION TUBE: Brand: INTERPULSE

## (undated) DEVICE — SUT MONOCRYL PLUS UD 3-0 --

## (undated) DEVICE — LEGGINGS, PAIR, 31X48, STERILE: Brand: MEDLINE

## (undated) DEVICE — TOWEL,OR,DSP,ST,BLUE,STD,4/PK,20PK/CS: Brand: MEDLINE

## (undated) DEVICE — STERILE LATEX POWDER-FREE SURGICAL GLOVESWITH NITRILE COATING: Brand: PROTEXIS

## (undated) DEVICE — SHEET,DRAPE,40X58,STERILE: Brand: MEDLINE